# Patient Record
Sex: MALE | Race: BLACK OR AFRICAN AMERICAN | NOT HISPANIC OR LATINO | Employment: OTHER | URBAN - METROPOLITAN AREA
[De-identification: names, ages, dates, MRNs, and addresses within clinical notes are randomized per-mention and may not be internally consistent; named-entity substitution may affect disease eponyms.]

---

## 2021-08-16 ENCOUNTER — OFFICE VISIT (OUTPATIENT)
Dept: FAMILY MEDICINE | Facility: CLINIC | Age: 71
End: 2021-08-16

## 2021-08-16 VITALS
HEIGHT: 70 IN | SYSTOLIC BLOOD PRESSURE: 129 MMHG | WEIGHT: 208.8 LBS | TEMPERATURE: 98.3 F | OXYGEN SATURATION: 99 % | BODY MASS INDEX: 29.89 KG/M2 | DIASTOLIC BLOOD PRESSURE: 85 MMHG | HEART RATE: 76 BPM

## 2021-08-16 DIAGNOSIS — Z79.899 MEDICATION MANAGEMENT: ICD-10-CM

## 2021-08-16 DIAGNOSIS — Z13.0 SCREENING FOR DEFICIENCY ANEMIA: ICD-10-CM

## 2021-08-16 DIAGNOSIS — N39.9 URINARY PROBLEM IN MALE: Primary | ICD-10-CM

## 2021-08-16 DIAGNOSIS — N40.1 BENIGN PROSTATIC HYPERPLASIA WITH WEAK URINARY STREAM: ICD-10-CM

## 2021-08-16 DIAGNOSIS — I10 BENIGN HYPERTENSION: ICD-10-CM

## 2021-08-16 DIAGNOSIS — Z12.11 COLON CANCER SCREENING: ICD-10-CM

## 2021-08-16 DIAGNOSIS — F52.32 DELAYED EJACULATION: ICD-10-CM

## 2021-08-16 DIAGNOSIS — R39.12 BENIGN PROSTATIC HYPERPLASIA WITH WEAK URINARY STREAM: ICD-10-CM

## 2021-08-16 DIAGNOSIS — Z12.5 SCREENING FOR PROSTATE CANCER: ICD-10-CM

## 2021-08-16 DIAGNOSIS — Z13.29 SCREENING FOR THYROID DISORDER: ICD-10-CM

## 2021-08-16 DIAGNOSIS — R97.20 ELEVATED PROSTATE SPECIFIC ANTIGEN (PSA): ICD-10-CM

## 2021-08-16 DIAGNOSIS — Z13.220 SCREENING FOR LIPID DISORDERS: ICD-10-CM

## 2021-08-16 LAB
ALBUMIN UR-MCNC: NEGATIVE MG/DL
APPEARANCE UR: CLEAR
BILIRUB UR QL STRIP: NEGATIVE
COLOR UR AUTO: YELLOW
ERYTHROCYTE [DISTWIDTH] IN BLOOD BY AUTOMATED COUNT: 14.4 % (ref 10–15)
GLUCOSE UR STRIP-MCNC: NEGATIVE MG/DL
HCT VFR BLD AUTO: 42.4 % (ref 40–53)
HGB BLD-MCNC: 14.7 G/DL (ref 13.3–17.7)
HGB UR QL STRIP: NEGATIVE
KETONES UR STRIP-MCNC: NEGATIVE MG/DL
LEUKOCYTE ESTERASE UR QL STRIP: NEGATIVE
MCH RBC QN AUTO: 30.4 PG (ref 26.5–33)
MCHC RBC AUTO-ENTMCNC: 34.7 G/DL (ref 31.5–36.5)
MCV RBC AUTO: 88 FL (ref 78–100)
NITRATE UR QL: NEGATIVE
PH UR STRIP: 5.5 [PH] (ref 5–7)
PLATELET # BLD AUTO: 224 10E3/UL (ref 150–450)
RBC # BLD AUTO: 4.83 10E6/UL (ref 4.4–5.9)
RBC #/AREA URNS AUTO: NORMAL /HPF
SP GR UR STRIP: 1.02 (ref 1–1.03)
UROBILINOGEN UR STRIP-ACNC: 0.2 E.U./DL
WBC # BLD AUTO: 7.1 10E3/UL (ref 4–11)
WBC #/AREA URNS AUTO: NORMAL /HPF

## 2021-08-16 PROCEDURE — 85027 COMPLETE CBC AUTOMATED: CPT | Performed by: INTERNAL MEDICINE

## 2021-08-16 PROCEDURE — 99204 OFFICE O/P NEW MOD 45 MIN: CPT | Performed by: INTERNAL MEDICINE

## 2021-08-16 PROCEDURE — 84443 ASSAY THYROID STIM HORMONE: CPT | Performed by: INTERNAL MEDICINE

## 2021-08-16 PROCEDURE — 81001 URINALYSIS AUTO W/SCOPE: CPT | Performed by: INTERNAL MEDICINE

## 2021-08-16 PROCEDURE — G0103 PSA SCREENING: HCPCS | Performed by: INTERNAL MEDICINE

## 2021-08-16 PROCEDURE — 80053 COMPREHEN METABOLIC PANEL: CPT | Performed by: INTERNAL MEDICINE

## 2021-08-16 PROCEDURE — 80061 LIPID PANEL: CPT | Performed by: INTERNAL MEDICINE

## 2021-08-16 PROCEDURE — 36415 COLL VENOUS BLD VENIPUNCTURE: CPT | Performed by: INTERNAL MEDICINE

## 2021-08-16 RX ORDER — TAMSULOSIN HYDROCHLORIDE 0.4 MG/1
0.4 CAPSULE ORAL DAILY
Qty: 30 CAPSULE | Refills: 1 | Status: SHIPPED | OUTPATIENT
Start: 2021-08-16 | End: 2021-09-10

## 2021-08-16 RX ORDER — LOSARTAN POTASSIUM AND HYDROCHLOROTHIAZIDE 12.5; 5 MG/1; MG/1
TABLET ORAL
COMMUNITY
Start: 2021-08-02 | End: 2022-12-14

## 2021-08-16 ASSESSMENT — MIFFLIN-ST. JEOR: SCORE: 1713.36

## 2021-08-16 NOTE — PROGRESS NOTES
"    Assessment & Plan     Camilo was seen today for abdominal pain.    Diagnoses and all orders for this visit:    Urinary problem in male  Comments:  weak stream  Orders:  -     UA with Microscopic reflex to Culture - lab collect; Future  -     UA with Microscopic reflex to Culture - lab collect  -     Urine Microscopic  Patient reports problem with urinary stream  It this week  Denied any pain on the bottom  Denied any urinary frequency or urgency  He has weak stream  He has delayed ejaculation sometimes   I think patient has enlarged prostate  I started him on Flomax  Will see if that improves his  urinary problem  We will do some basic lab work  Down the road he can be referred to urology  Patient does not have any  primary care provider  He would like to get established with male Doctor  Follow-up is  scheduled with Dr. Saavedra       Delayed ejaculation  I have ordered basic lab work  Will refer to urology if needed      Benign hypertension  -     Comprehensive metabolic panel (BMP + Alb, Alk Phos, ALT, AST, Total. Bili, TP); Future  -     Comprehensive metabolic panel (BMP + Alb, Alk Phos, ALT, AST, Total. Bili, TP)  Well-controlled    Screening for thyroid disorder  TSH      Benign prostatic hyperplasia with weak urinary stream  -     tamsulosin (FLOMAX) 0.4 MG capsule; Take 1 capsule (0.4 mg) by mouth daily    Screening for deficiency anemia  -     CBC with platelets; Future  -     CBC with platelets        Screening for prostate cancer  -     PSA, screen; Future  -     PSA, screen    Screening for lipid disorders  -     Lipid panel reflex to direct LDL Non-fasting; Future  -     Lipid panel reflex to direct LDL Non-fasting    Colon cancer screening  -     Fecal colorectal cancer screen (FIT); Future           BMI:   Estimated body mass index is 29.96 kg/m  as calculated from the following:    Height as of this encounter: 1.778 m (5' 10\").    Weight as of this encounter: 94.7 kg (208 lb 12.8 oz). " "      See Patient Instructions  Patient Instructions   Labs today  Start taking flomax 0.4 mg daily  Follow up in 2-3 weeks  Seek sooner medical attention if there is any worsening of symptoms or problems.        Return in about 2 weeks (around 8/30/2021) for Hypertension BPH.    Nickie Dial MD  [facility] CEE Page is a 71 year old who presents for the following health issues     HPI     Complaining of decreased stream of urine   no pain, no burning  No fever, no blood  Difficulty with ejaculation    Review of Systems   Constitutional, HEENT, cardiovascular, pulmonary, GI, , musculoskeletal, neuro, skin, endocrine and psych systems are negative, except as otherwise noted.      Objective    /85 (BP Location: Right arm, Cuff Size: Adult Large)   Pulse 76   Temp 98.3  F (36.8  C) (Tympanic)   Ht 1.778 m (5' 10\")   Wt 94.7 kg (208 lb 12.8 oz)   SpO2 99%   BMI 29.96 kg/m    Body mass index is 29.96 kg/m .  Physical Exam     She is very nice and pleasant.  She is comfortable and not in any kind of distress.  She is fully alert awake oriented.  Abdomen soft no guarding ,no rigidity, bowel sounds are positive  Prostate is slightly enlarged  No abnormal masses were felt  I did not do testicular and penile exam as patient did not feel comfortable to be done by me.    30 minutes spent on the date of the encounter doing chart review, history and exam, documentation and further activities as noted abov        Number so could be related headacheDisclaimer: This note consists of symbols derived from keyboarding, dictation and/or voice recognition software. As a result, there may be errors in the script that have gone undetected. Please consider this when interpreting information found in this chart.    "

## 2021-08-16 NOTE — LETTER
August 18, 2021      Camilo Vela  [address]  [facility] [address]        Dear [place],    We are writing to inform you of your test results.    Tonny Page,     This is to inform you regarding your test result.     I tried to contact you yesterday and today but got the VM.   Your PSA (prostate cancer screening test)  is slightly elevated   This is not uncommon in your age group   Due to your symptoms   I recommend that you see urologist    Please call the following number to make the appointment :   [facility]: [facility] [phone]     TSH which is thyroid hormone is normal.   The testing of your kidney function, liver function and electrolytes was satisfactory .   Glucose which is your blood sugar is elevated.   Your total cholesterol is elevated.   The triglycerides are high. Lowering  the amount of sugar ,alcohol and sweets in the diet helps to control this.Exercise and weight loss helps.   Your LDL which is called bad cholesterol is elevated.   Eat low cholesterol low fat  diet and do regular physical activity.   Avoid high sugar containing food   Follow up with your PCP         Resulted Orders   PSA, screen   Result Value Ref Range    Prostate Specific Antigen Screen 4.74 (H) 0.00 - 4.00 ug/L   Comprehensive metabolic panel (BMP + Alb, Alk Phos, ALT, AST, Total. Bili, TP)   Result Value Ref Range    Sodium 138 133 - 144 mmol/L    Potassium 4.3 3.4 - 5.3 mmol/L    Chloride 106 94 - 109 mmol/L    Carbon Dioxide (CO2) 30 20 - 32 mmol/L    Anion Gap 2 (L) 3 - 14 mmol/L    Urea Nitrogen 12 7 - 30 mg/dL    Creatinine 0.79 0.66 - 1.25 mg/dL    Calcium 9.6 8.5 - 10.1 mg/dL    Glucose 164 (H) 70 - 99 mg/dL    Alkaline Phosphatase 82 40 - 150 U/L    AST 21 0 - 45 U/L    ALT 30 0 - 70 U/L    Protein Total 7.5 6.8 - 8.8 g/dL    Albumin 3.7 3.4 - 5.0 g/dL    Bilirubin Total 0.3 0.2 - 1.3 mg/dL    GFR Estimate >90 >60 mL/min/1.73m2      Comment:      As of July 11, 2021, eGFR is calculated by  the CKD-EPI creatinine equation, without race adjustment. eGFR can be influenced by muscle mass, exercise, and diet. The reported eGFR is an estimation only and is only applicable if the renal function is stable.   UA with Microscopic reflex to Culture - lab collect   Result Value Ref Range    Color Urine Yellow Colorless, Straw, Light Yellow, Yellow    Appearance Urine Clear Clear    Glucose Urine Negative Negative mg/dL    Bilirubin Urine Negative Negative    Ketones Urine Negative Negative mg/dL    Specific Gravity Urine 1.020 1.003 - 1.035    Blood Urine Negative Negative    pH Urine 5.5 5.0 - 7.0    Protein Albumin Urine Negative Negative mg/dL    Urobilinogen Urine 0.2 0.2, 1.0 E.U./dL    Nitrite Urine Negative Negative    Leukocyte Esterase Urine Negative Negative   CBC with platelets   Result Value Ref Range    WBC Count 7.1 4.0 - 11.0 10e3/uL    RBC Count 4.83 4.40 - 5.90 10e6/uL    Hemoglobin 14.7 13.3 - 17.7 g/dL    Hematocrit 42.4 40.0 - 53.0 %    MCV 88 78 - 100 fL    MCH 30.4 26.5 - 33.0 pg    MCHC 34.7 31.5 - 36.5 g/dL    RDW 14.4 10.0 - 15.0 %    Platelet Count 224 150 - 450 10e3/uL   Lipid panel reflex to direct LDL Non-fasting   Result Value Ref Range    Cholesterol 234 (H) <200 mg/dL      Comment:      Age 0-19 years  Desirable: <170 mg/dL  Borderline high:  170-199 mg/dl  High:            >199 mg/dl    Age 20 years and older  Desirable: <200 mg/dL    Triglycerides 195 (H) <150 mg/dL      Comment:      0-9 years:  Normal:    Less than 75 mg/dL  Borderline high:  75-99 mg/dL  High:             Greater than or equal to 100 mg/dL    0-19 years:  Normal:    Less than 90 mg/dL  Borderline high:   mg/dL  High:             Greater than or equal to 130 mg/dL    20 years and older:  Normal:    Less than 150 mg/dL  Borderline high:  150-199 mg/dL  High:             200-499 mg/dL  Very high:   Greater than or equal to 500 mg/dL    Direct Measure HDL 52 >=40 mg/dL      Comment:      0-19 years:        Greater than or equal to 45 mg/dL   Low: Less than 40 mg/dL   Borderline low: 40-44 mg/dL     20 years and older:   Female: Greater than or equal to 50 mg/dL   Male:   Greater than or equal to 40 mg/dL         LDL Cholesterol Calculated 143 (H) <=100 mg/dL      Comment:      Age 0-19 years:  Desirable: 0-110 mg/dL   Borderline high: 110-129 mg/dL   High: >= 130 mg/dL    Age 20 years and older:  Desirable: <100mg/dL  Above desirable: 100-129 mg/dL   Borderline high: 130-159 mg/dL   High: 160-189 mg/dL   Very high: >= 190 mg/dL    Non HDL Cholesterol 182 (H) <130 mg/dL      Comment:      0-19 years:  Desirable:          Less than 120 mg/dL  Borderline high:   120-144 mg/dL  High:                   Greater than or equal to 145 mg/dL    20 years and older:  Desirable:          130 mg/dL  Above Desirable: 130-159 mg/dL  Borderline high:   160-189 mg/dL  High:               190-219 mg/dL  Very high:     Greater than or equal to 220 mg/dL    Patient Fasting > 8hrs? No    Urine Microscopic   Result Value Ref Range    RBC Urine 0-2 0-2 /HPF /HPF    WBC Urine 0-5 0-5 /HPF /HPF    Narrative    Urine Culture not indicated   TSH with free T4 reflex   Result Value Ref Range    TSH 1.28 0.40 - 4.00 mU/L       If you have any questions or concerns, please call the clinic at the number listed above.         Sincerely,       Dr.Nasima Yojana MD,FACP

## 2021-08-16 NOTE — PATIENT INSTRUCTIONS
Labs today  Start taking flomax 0.4 mg daily  Follow up in 2-3 weeks  Seek sooner medical attention if there is any worsening of symptoms or problems.

## 2021-08-16 NOTE — PROGRESS NOTES
"    {PROVIDER CHARTING PREFERENCE:142490}    Subjective   Camilo is a 71 year old who presents for the following health issues {ACCOMPANIED BY STATEMENT (Optional):533450}    HPI     {SUPERLIST (Optional):839963}  {additonal problems for provider to add (Optional):560952}    Review of Systems   {ROS COMP (Optional):644049}      Objective    /85 (BP Location: Right arm, Cuff Size: Adult Large)   Pulse 76   Temp 98.3  F (36.8  C) (Tympanic)   Ht 1.778 m (5' 10\")   Wt 94.7 kg (208 lb 12.8 oz)   SpO2 99%   BMI 29.96 kg/m    Body mass index is 29.96 kg/m .  Physical Exam   {Exam List (Optional):379507}    {Diagnostic Test Results (Optional):039969}    {AMBULATORY ATTESTATION (Optional):317103}        "

## 2021-08-17 LAB
ALBUMIN SERPL-MCNC: 3.7 G/DL (ref 3.4–5)
ALP SERPL-CCNC: 82 U/L (ref 40–150)
ALT SERPL W P-5'-P-CCNC: 30 U/L (ref 0–70)
ANION GAP SERPL CALCULATED.3IONS-SCNC: 2 MMOL/L (ref 3–14)
AST SERPL W P-5'-P-CCNC: 21 U/L (ref 0–45)
BILIRUB SERPL-MCNC: 0.3 MG/DL (ref 0.2–1.3)
BUN SERPL-MCNC: 12 MG/DL (ref 7–30)
CALCIUM SERPL-MCNC: 9.6 MG/DL (ref 8.5–10.1)
CHLORIDE BLD-SCNC: 106 MMOL/L (ref 94–109)
CHOLEST SERPL-MCNC: 234 MG/DL
CO2 SERPL-SCNC: 30 MMOL/L (ref 20–32)
CREAT SERPL-MCNC: 0.79 MG/DL (ref 0.66–1.25)
FASTING STATUS PATIENT QL REPORTED: NO
GFR SERPL CREATININE-BSD FRML MDRD: >90 ML/MIN/1.73M2
GLUCOSE BLD-MCNC: 164 MG/DL (ref 70–99)
HDLC SERPL-MCNC: 52 MG/DL
LDLC SERPL CALC-MCNC: 143 MG/DL
NONHDLC SERPL-MCNC: 182 MG/DL
POTASSIUM BLD-SCNC: 4.3 MMOL/L (ref 3.4–5.3)
PROT SERPL-MCNC: 7.5 G/DL (ref 6.8–8.8)
PSA SERPL-MCNC: 4.74 UG/L (ref 0–4)
SODIUM SERPL-SCNC: 138 MMOL/L (ref 133–144)
TRIGL SERPL-MCNC: 195 MG/DL
TSH SERPL DL<=0.005 MIU/L-ACNC: 1.28 MU/L (ref 0.4–4)

## 2021-08-17 NOTE — RESULT ENCOUNTER NOTE
Tonny Page,    This is to inform you regarding your test result.    CBC result which includes white count Hemoglobin and  Platelet Counts is normal.   Urine test is negative for infection.  Other test results are pending.        Sincerely,      Dr.Nasima Yojana MD,FACP

## 2021-08-18 PROBLEM — R97.20 ELEVATED PROSTATE SPECIFIC ANTIGEN (PSA): Status: ACTIVE | Noted: 2021-08-18

## 2021-08-18 PROBLEM — F52.32 DELAYED EJACULATION: Status: ACTIVE | Noted: 2021-08-18

## 2021-08-18 NOTE — RESULT ENCOUNTER NOTE
Tonny Page,    This is to inform you regarding your test result.    I tried to contact you yesterday and today but got the VM.  Your PSA (prostate cancer screening test)  is slightly elevated   This is not uncommon in your age group  Due to your symptoms   I recommend that you see urologist   Please call the following number to make the appointment :  [facility]: [facility] [place] [phone]    TSH which is thyroid hormone is normal.  The testing of your kidney function, liver function and electrolytes was satisfactory .  Glucose which is your blood sugar is elevated.  Your total cholesterol is elevated.  The triglycerides are high. Lowering  the amount of sugar ,alcohol and sweets in the diet helps to control this.Exercise and weight loss helps.  Your LDL which is called bad cholesterol is elevated.  Eat low cholesterol low fat  diet and do regular physical activity.  Avoid high sugar containing food   Follow up with your PCP          Sincerely,      Dr.Nasima Yojana MD,FACP

## 2021-09-08 DIAGNOSIS — N40.1 BENIGN PROSTATIC HYPERPLASIA WITH WEAK URINARY STREAM: ICD-10-CM

## 2021-09-08 DIAGNOSIS — R39.12 BENIGN PROSTATIC HYPERPLASIA WITH WEAK URINARY STREAM: ICD-10-CM

## 2021-09-10 RX ORDER — TAMSULOSIN HYDROCHLORIDE 0.4 MG/1
CAPSULE ORAL
Qty: 90 CAPSULE | Refills: 2 | Status: SHIPPED | OUTPATIENT
Start: 2021-09-10 | End: 2021-12-29

## 2021-10-11 ENCOUNTER — HEALTH MAINTENANCE LETTER (OUTPATIENT)
Age: 71
End: 2021-10-11

## 2022-09-25 ENCOUNTER — HEALTH MAINTENANCE LETTER (OUTPATIENT)
Age: 72
End: 2022-09-25

## 2022-12-14 ENCOUNTER — OFFICE VISIT (OUTPATIENT)
Dept: FAMILY MEDICINE | Facility: CLINIC | Age: 72
End: 2022-12-14

## 2022-12-14 VITALS
HEART RATE: 66 BPM | OXYGEN SATURATION: 99 % | BODY MASS INDEX: 30.81 KG/M2 | RESPIRATION RATE: 18 BRPM | TEMPERATURE: 96.6 F | WEIGHT: 208 LBS | SYSTOLIC BLOOD PRESSURE: 138 MMHG | HEIGHT: 69 IN | DIASTOLIC BLOOD PRESSURE: 88 MMHG

## 2022-12-14 DIAGNOSIS — Z00.00 ROUTINE GENERAL MEDICAL EXAMINATION AT A HEALTH CARE FACILITY: Primary | ICD-10-CM

## 2022-12-14 DIAGNOSIS — N40.1 BENIGN PROSTATIC HYPERPLASIA WITH WEAK URINARY STREAM: ICD-10-CM

## 2022-12-14 DIAGNOSIS — I10 ESSENTIAL HYPERTENSION, BENIGN: ICD-10-CM

## 2022-12-14 DIAGNOSIS — R73.01 IFG (IMPAIRED FASTING GLUCOSE): ICD-10-CM

## 2022-12-14 DIAGNOSIS — R39.12 BENIGN PROSTATIC HYPERPLASIA WITH WEAK URINARY STREAM: ICD-10-CM

## 2022-12-14 LAB
ALBUMIN SERPL BCG-MCNC: 4.4 G/DL (ref 3.5–5.2)
ALP SERPL-CCNC: 91 U/L (ref 40–129)
ALT SERPL W P-5'-P-CCNC: 26 U/L (ref 10–50)
ANION GAP SERPL CALCULATED.3IONS-SCNC: 10 MMOL/L (ref 7–15)
AST SERPL W P-5'-P-CCNC: 28 U/L (ref 10–50)
BILIRUB SERPL-MCNC: 0.5 MG/DL
BUN SERPL-MCNC: 15.6 MG/DL (ref 8–23)
CALCIUM SERPL-MCNC: 9.8 MG/DL (ref 8.8–10.2)
CHLORIDE SERPL-SCNC: 102 MMOL/L (ref 98–107)
CHOLEST SERPL-MCNC: 208 MG/DL
CREAT SERPL-MCNC: 0.93 MG/DL (ref 0.67–1.17)
DEPRECATED HCO3 PLAS-SCNC: 28 MMOL/L (ref 22–29)
ERYTHROCYTE [DISTWIDTH] IN BLOOD BY AUTOMATED COUNT: 13.3 % (ref 10–15)
GFR SERPL CREATININE-BSD FRML MDRD: 88 ML/MIN/1.73M2
GLUCOSE SERPL-MCNC: 124 MG/DL (ref 70–99)
HBA1C MFR BLD: 7.2 % (ref 0–5.6)
HCT VFR BLD AUTO: 44.8 % (ref 40–53)
HDLC SERPL-MCNC: 52 MG/DL
HGB BLD-MCNC: 14.8 G/DL (ref 13.3–17.7)
LDLC SERPL CALC-MCNC: 135 MG/DL
MCH RBC QN AUTO: 29 PG (ref 26.5–33)
MCHC RBC AUTO-ENTMCNC: 33 G/DL (ref 31.5–36.5)
MCV RBC AUTO: 88 FL (ref 78–100)
NONHDLC SERPL-MCNC: 156 MG/DL
PLATELET # BLD AUTO: 210 10E3/UL (ref 150–450)
POTASSIUM SERPL-SCNC: 4.6 MMOL/L (ref 3.4–5.3)
PROT SERPL-MCNC: 7.7 G/DL (ref 6.4–8.3)
PSA SERPL-MCNC: 5.92 NG/ML (ref 0–6.5)
RBC # BLD AUTO: 5.11 10E6/UL (ref 4.4–5.9)
SODIUM SERPL-SCNC: 140 MMOL/L (ref 136–145)
TRIGL SERPL-MCNC: 105 MG/DL
WBC # BLD AUTO: 6.8 10E3/UL (ref 4–11)

## 2022-12-14 PROCEDURE — 99397 PER PM REEVAL EST PAT 65+ YR: CPT | Performed by: INTERNAL MEDICINE

## 2022-12-14 PROCEDURE — 83036 HEMOGLOBIN GLYCOSYLATED A1C: CPT | Performed by: INTERNAL MEDICINE

## 2022-12-14 PROCEDURE — 36415 COLL VENOUS BLD VENIPUNCTURE: CPT | Performed by: INTERNAL MEDICINE

## 2022-12-14 PROCEDURE — G0103 PSA SCREENING: HCPCS | Performed by: INTERNAL MEDICINE

## 2022-12-14 PROCEDURE — 80061 LIPID PANEL: CPT | Performed by: INTERNAL MEDICINE

## 2022-12-14 PROCEDURE — 80053 COMPREHEN METABOLIC PANEL: CPT | Performed by: INTERNAL MEDICINE

## 2022-12-14 PROCEDURE — 85027 COMPLETE CBC AUTOMATED: CPT | Performed by: INTERNAL MEDICINE

## 2022-12-14 RX ORDER — LOSARTAN POTASSIUM AND HYDROCHLOROTHIAZIDE 12.5; 5 MG/1; MG/1
1 TABLET ORAL DAILY
Qty: 90 TABLET | Refills: 3 | Status: SHIPPED | OUTPATIENT
Start: 2022-12-14 | End: 2022-12-14

## 2022-12-14 RX ORDER — PANTOPRAZOLE SODIUM 40 MG/1
TABLET, DELAYED RELEASE ORAL
COMMUNITY
Start: 2022-11-04 | End: 2023-04-27

## 2022-12-14 RX ORDER — TAMSULOSIN HYDROCHLORIDE 0.4 MG/1
0.4 CAPSULE ORAL DAILY
Qty: 90 CAPSULE | Refills: 3 | Status: SHIPPED | OUTPATIENT
Start: 2022-12-14 | End: 2022-12-14

## 2022-12-14 RX ORDER — LOSARTAN POTASSIUM AND HYDROCHLOROTHIAZIDE 12.5; 5 MG/1; MG/1
1 TABLET ORAL DAILY
Qty: 90 TABLET | Refills: 3 | Status: SHIPPED | OUTPATIENT
Start: 2022-12-14 | End: 2023-10-09

## 2022-12-14 RX ORDER — TAMSULOSIN HYDROCHLORIDE 0.4 MG/1
0.4 CAPSULE ORAL DAILY
Qty: 90 CAPSULE | Refills: 3 | Status: SHIPPED | OUTPATIENT
Start: 2022-12-14 | End: 2023-04-27

## 2022-12-14 ASSESSMENT — PAIN SCALES - GENERAL: PAINLEVEL: NO PAIN (0)

## 2022-12-14 NOTE — PROGRESS NOTES
"Leticia Page is a 72 year old accompanied by his son, presenting for the following health issues:  No chief complaint on file.      History of Present Illness       Reason for visit:  Physical    He eats 2-3 servings of fruits and vegetables daily.He consumes 2 sweetened beverage(s) daily.He exercises with enough effort to increase his heart rate 10 to 19 minutes per day.  He exercises with enough effort to increase his heart rate 3 or less days per week.   He is taking medications regularly.        Benign prostatic hyperplasia with weak urinary stream  Essential hypertension, al Vela has been doing well with lisinopril/hctz.  No side effects.  No light-headedness.  No dizziness.  He is not drinking enough water.  He is drinking tea, smoothies.  He has no dry cough.        Review of Systems   Constitutional, HEENT, cardiovascular, pulmonary, GI,  + urinary frequency, musculoskeletal, neuro, skin, endocrine and psych systems are negative, except as otherwise noted.      Objective    /88 (BP Location: Right arm, Patient Position: Chair, Cuff Size: Adult Large)   Pulse 66   Temp (!) 96.6  F (35.9  C) (Temporal)   Resp 18   Ht 1.753 m (5' 9\")   Wt 94.3 kg (208 lb)   SpO2 99%   BMI 30.72 kg/m    Body mass index is 30.72 kg/m .  Physical Exam   GENERAL: healthy, alert and no distress  EYES: Eyes grossly normal to inspection, PERRL and conjunctivae and sclerae normal  HENT: ear canals and TM's normal, nose and mouth without ulcers or lesions  NECK: no adenopathy, no asymmetry, masses, or scars and thyroid normal to palpation  RESP: lungs clear to auscultation - no rales, rhonchi or wheezes  CV: regular rate and rhythm, normal S1 S2, no S3 or S4, no murmur, click or rub   ABDOMEN: soft, nontender, no hepatosplenomegaly   MS: no gross musculoskeletal defects noted, no edema  SKIN: no suspicious lesions or rashes  NEURO: Normal strength and tone, mentation intact and speech " normal  PSYCH: mentation appears normal, affect normal/bright      Patient Instructions   (Z00.00) Routine general medical examination at a health care facility  (primary encounter diagnosis)  Comment: For routine exam, we will draw labs as ordered, cholesterol, diabetes mellitus check, liver function, renal function, PSA and refer for colonoscopy.  We will also update vaccination history. TDAP, PCV 20 (pneumonia) recommended. Shingrix vaccine is now available.  I would call your insurance to see if a shingles vaccine is covered and get this at your pharmacy   Plan: Prostate Specific Antigen Screen, Lipid panel         reflex to direct LDL Fasting, Comprehensive         metabolic panel, CBC with platelets,         Colonoscopy Screening  Referral            (N40.1,  R39.12) Benign prostatic hyperplasia with weak urinary stream  Comment: OK to continue Flomax  Plan: tamsulosin (FLOMAX) 0.4 MG capsule            (I10) Essential hypertension, benign  Comment: Continue losartan/hydrochlorothiazide   Plan: losartan-hydrochlorothiazide (HYZAAR) 50-12.5         MG tablet              [facility]  [address], [place] [address] [phone]

## 2022-12-14 NOTE — PATIENT INSTRUCTIONS
(Z00.00) Routine general medical examination at a health care facility  (primary encounter diagnosis)  Comment: For routine exam, we will draw labs as ordered, cholesterol, diabetes mellitus check, liver function, renal function, PSA and refer for colonoscopy.  We will also update vaccination history. TDAP, PCV 20 (pneumonia) recommended. Shingrix vaccine is now available.  I would call your insurance to see if a shingles vaccine is covered and get this at your pharmacy   Plan: Prostate Specific Antigen Screen, Lipid panel         reflex to direct LDL Fasting, Comprehensive         metabolic panel, CBC with platelets,         Colonoscopy Screening  Referral            (N40.1,  R39.12) Benign prostatic hyperplasia with weak urinary stream  Comment: OK to continue Flomax  Plan: tamsulosin (FLOMAX) 0.4 MG capsule            (I10) Essential hypertension, benign  Comment: Continue losartan/hydrochlorothiazide   Plan: losartan-hydrochlorothiazide (HYZAAR) 50-12.5         MG tablet              [facility]  [address], [place] [address] [phone]

## 2022-12-16 ENCOUNTER — TELEPHONE (OUTPATIENT)
Dept: FAMILY MEDICINE | Facility: CLINIC | Age: 72
End: 2022-12-16

## 2022-12-16 DIAGNOSIS — E11.9 TYPE 2 DIABETES MELLITUS WITHOUT COMPLICATION, WITHOUT LONG-TERM CURRENT USE OF INSULIN (H): ICD-10-CM

## 2022-12-16 DIAGNOSIS — E78.5 HYPERLIPIDEMIA LDL GOAL <100: Primary | ICD-10-CM

## 2022-12-16 NOTE — LETTER
December 22, 2022      Camilo Vela  [address]  [place] [facility] [address]              Dear Camilo,       I had the opportunity to review your recent labs and a summary of your labs reads as follows:     Your complete blood counts show no sign of anemia, normal white blood cell count and platelets.  Your comprehensive metabolic panel showed normal renal function, normal liver function,   Your hemoglobin A1c shows worsening average and I would recommend that you schedule a follow up to discuss diagnosis of diabetes mellitus   Your fasting lipid panel show  - normal HDL (good) cholesterol -as your goal is greater than 40  - low LDL (bad) cholesterol as your goal is less than 160 - however there has recently been a change in the guidelines for treatment of cholesterol and we now are recommending that a the decision to take statin medications be based more so on the calculated cardiac risk as estimated by The American College of Cardiology Risk .  Given your estimated risk of coronary artery disease > 7.5% I would recommend starting a new medication of atorvastatin 20 mg.   - normal triglyceride levels  Your PSA level is also stable indicating no evidence of prostate cancer            Sincerely,  Steve Hernandez MD               Sincerely,      Steve Hernandez MD

## 2022-12-17 NOTE — RESULT ENCOUNTER NOTE
Yury Page,    I had the opportunity to review your recent labs and a summary of your labs reads as follows:    Your complete blood counts show no sign of anemia, normal white blood cell count and platelets.  Your comprehensive metabolic panel showed normal renal function, normal liver function,   Your hemoglobin A1c shows worsening average and I would recommend that you schedule a follow up to discuss diagnosis of diabetes mellitus   Your fasting lipid panel show  - normal HDL (good) cholesterol -as your goal is greater than 40  - low LDL (bad) cholesterol as your goal is less than 160 - however there has recently been a change in the guidelines for treatment of cholesterol and we now are recommending that a the decision to take statin medications be based more so on the calculated cardiac risk as estimated by The American College of Cardiology Risk .  Given your estimated risk of coronary artery disease > 7.5% I would recommend starting a new medication of atorvastatin 20 mg.   - normal triglyceride levels  Your PSA level is also stable indicating no evidence of prostate cancer         Sincerely,  Steve Hernandez MD

## 2022-12-17 NOTE — TELEPHONE ENCOUNTER
Can we call Camilo Vela and let him know that       Yury Page,    I had the opportunity to review your recent labs and a summary of your labs reads as follows:    Your complete blood counts show no sign of anemia, normal white blood cell count and platelets.  Your comprehensive metabolic panel showed normal renal function, normal liver function,   Your hemoglobin A1c shows worsening average and I would recommend that you schedule a follow up to discuss diagnosis of diabetes mellitus   Your fasting lipid panel show  - normal HDL (good) cholesterol -as your goal is greater than 40  - low LDL (bad) cholesterol as your goal is less than 160 - however there has recently been a change in the guidelines for treatment of cholesterol and we now are recommending that a the decision to take statin medications be based more so on the calculated cardiac risk as estimated by The American College of Cardiology Risk .  Given your estimated risk of coronary artery disease > 7.5% I would recommend starting a new medication of atorvastatin 20 mg.   - normal triglyceride levels  Your PSA level is also stable indicating no evidence of prostate cancer           Sincerely,  Steve Hernandez MD

## 2022-12-20 NOTE — TELEPHONE ENCOUNTER
Patient Contact    Attempt # 1    Was call answered?  No.  Left message on voicemail with information to call me back.    On call back, please relay provider message.

## 2022-12-21 NOTE — TELEPHONE ENCOUNTER
Patient Contact    Attempt # 2    Was call answered?  No.  Left message on voicemail with information to call me back.      Upon chart review, pt has read results and note from PCP. Sent [employer] message with information to call clinic back if pt has any questions or concerns.     Ness Mattson RN

## 2022-12-22 NOTE — TELEPHONE ENCOUNTER
To PCP:  ELPIDIO, mailed letter to patient due to patient has not checked mychart message and has not answered phone calls.      Patient Contact    Attempt # 3    Was call answered? No.    Left message for patient to call triage back.    Priscila Casarez RN

## 2023-02-03 ENCOUNTER — OFFICE VISIT (OUTPATIENT)
Dept: FAMILY MEDICINE | Facility: CLINIC | Age: 73
End: 2023-02-03
Payer: MEDICARE

## 2023-02-03 VITALS
RESPIRATION RATE: 18 BRPM | DIASTOLIC BLOOD PRESSURE: 75 MMHG | WEIGHT: 199.1 LBS | HEIGHT: 70 IN | BODY MASS INDEX: 28.5 KG/M2 | SYSTOLIC BLOOD PRESSURE: 132 MMHG | TEMPERATURE: 97.3 F | HEART RATE: 66 BPM | OXYGEN SATURATION: 99 %

## 2023-02-03 DIAGNOSIS — Z12.11 SCREEN FOR COLON CANCER: ICD-10-CM

## 2023-02-03 DIAGNOSIS — E78.5 HYPERLIPIDEMIA LDL GOAL <100: ICD-10-CM

## 2023-02-03 DIAGNOSIS — N13.9 LOWER URINARY TRACT OBSTRUCTIVE SYNDROME: ICD-10-CM

## 2023-02-03 DIAGNOSIS — I10 BENIGN HYPERTENSION: Primary | ICD-10-CM

## 2023-02-03 DIAGNOSIS — Z87.898 HISTORY OF VERTIGO: ICD-10-CM

## 2023-02-03 DIAGNOSIS — E11.9 TYPE 2 DIABETES MELLITUS WITHOUT COMPLICATION, WITHOUT LONG-TERM CURRENT USE OF INSULIN (H): ICD-10-CM

## 2023-02-03 DIAGNOSIS — R97.20 ELEVATED PROSTATE SPECIFIC ANTIGEN (PSA): ICD-10-CM

## 2023-02-03 LAB
CHOLEST SERPL-MCNC: 208 MG/DL
CREAT UR-MCNC: 237 MG/DL
HBA1C MFR BLD: 6.8 % (ref 0–5.6)
HDLC SERPL-MCNC: 52 MG/DL
LDLC SERPL CALC-MCNC: 143 MG/DL
MICROALBUMIN UR-MCNC: <12 MG/L
MICROALBUMIN/CREAT UR: NORMAL MG/G{CREAT}
NONHDLC SERPL-MCNC: 156 MG/DL
TRIGL SERPL-MCNC: 66 MG/DL

## 2023-02-03 PROCEDURE — 99215 OFFICE O/P EST HI 40 MIN: CPT | Performed by: INTERNAL MEDICINE

## 2023-02-03 PROCEDURE — 80061 LIPID PANEL: CPT | Performed by: INTERNAL MEDICINE

## 2023-02-03 PROCEDURE — 82570 ASSAY OF URINE CREATININE: CPT | Performed by: INTERNAL MEDICINE

## 2023-02-03 PROCEDURE — 83036 HEMOGLOBIN GLYCOSYLATED A1C: CPT | Performed by: INTERNAL MEDICINE

## 2023-02-03 PROCEDURE — 36415 COLL VENOUS BLD VENIPUNCTURE: CPT | Performed by: INTERNAL MEDICINE

## 2023-02-03 PROCEDURE — 82043 UR ALBUMIN QUANTITATIVE: CPT | Performed by: INTERNAL MEDICINE

## 2023-02-03 ASSESSMENT — PAIN SCALES - GENERAL: PAINLEVEL: NO PAIN (0)

## 2023-02-03 NOTE — PROGRESS NOTES
"  Assessment & Plan   Problem List Items Addressed This Visit        Endocrine    Type 2 diabetes mellitus without complication (H)    Relevant Orders    Albumin Random Urine Quantitative with Creat Ratio (Completed)    Adult Eye  Referral    Hemoglobin A1c (Completed)    AMB Adult Diabetes Educator Referral    Hyperlipidemia LDL goal <100    Relevant Orders    Lipid panel reflex to direct LDL Fasting (Completed)       Circulatory    Benign hypertension - Primary    Relevant Orders    Albumin Random Urine Quantitative with Creat Ratio (Completed)       Other    Elevated prostate specific antigen (PSA)    Relevant Orders    Adult Urology  Referral   Other Visit Diagnoses     Screen for colon cancer        Lower urinary tract obstructive syndrome        History of vertigo             His vertigo symptoms have resolved since 5 months now, currently no neurologic deficit or cerebellar signs or symptoms on exam or by history.  Will continue to observe ; if any recurrent vertiginous or balance symptoms we will go ahead and do some imaging tests/studies and he will need to notify us immediately and come for exam and evaluation.  Will repeat labs per his request; lipid and A1c although it could be too early.  He made Some diet adjustment, likes to check where his Lab numbers are.  He was advised to start on cholesterol medication, which he did not, once again wants to wait for the lab results.  Blood pressure seems reasonably controlled on above medications.  Continue to monitor as outpatient and call us with BP readings.  Check a microalbumin.  Referral For diabetic eye exam: Referred to diabetic educator as well.  Further recommendation pending lab results.  For lower urinary symptoms advised to take flomax, and referral to urology; due to elevated PSA         BMI:   Estimated body mass index is 28.57 kg/m  as calculated from the following:    Height as of this encounter: 1.778 m (5' 10\").    Weight as " "of this encounter: 90.3 kg (199 lb 1.6 oz).   Weight management plan: Discussed healthy diet and exercise guidelines    Work on weight loss  Regular exercise  See Patient Instructions    Return if symptoms worsen or fail to improve, for As needed and if symptoms worsen.  Total time spent was 40 minutes, review of records, addressing multiple health conditions and exam.    Janey Saavedra MD  [facility] CEE Page is a 73 year old presenting for the following health issues:  Follow Up (Patient here to follow up on lab results.)      History of Present Illness       Reason for visit:  Physical Check    He eats 2-3 servings of fruits and vegetables daily.He consumes 0 sweetened beverage(s) daily.He exercises with enough effort to increase his heart rate 30 to 60 minutes per day.    He is taking medications regularly.       Patient presenting for follow-up for his blood pressure and also diabetes recent diagnosis A1c 7.2 he had made changes to his diet.  He reports his blood pressures in the 130s systolic at home.  Also is complaining of some vertiginous symptoms and imbalance occurred for couple days 5 couple months when [place] was overse[place] but symptoms seems to have abated now, he would like further checking on that.  Denies any chest pain and no dyspnea or palpitations  Describes some lower urinary obstructive symptoms ; voiding is slow.  --not using flomax; though was advised  Describes spinning sensation when lying supine, this [place] was [place], occurred for a week, no balance issues anymore, this happened 5 months ago      Review of Systems   Constitutional, HEENT, cardiovascular, pulmonary, gi and gu systems are negative, except as otherwise noted.      Objective    /75   Pulse 66   Temp 97.3  F (36.3  C) (Temporal)   Resp 18   Ht 1.778 m (5' 10\")   Wt 90.3 kg (199 lb 1.6 oz)   SpO2 99%   BMI 28.57 kg/m    Body mass index is 28.57 kg/m .  Physical Exam "   GENERAL: healthy, alert and no distress  EYES: Eyes grossly normal to inspection, PERRL and conjunctivae and sclerae normal  NECK: no adenopathy, no asymmetry, masses, or scars and thyroid normal to palpation, no carotid  RESP: lungs clear to auscultation - no rales, rhonchi or wheezes  CV: regular rate and rhythm, normal S1 S2, no S3 or S4, no murmur, click or rub, no peripheral edema and peripheral pulses strong  ABDOMEN: soft, nontender, no hepatosplenomegaly, no masses and bowel sounds normal  MS: no gross musculoskeletal defects noted, no edema  SKIN: no suspicious lesions or rashes  NEURO: Normal strength and tone, mentation intact and speech normal, normal finger-to-nose exam negative for cerebellar signs, normal tandem walking, negative Romberg, no dysmetria or dysdiadochokinesia. Normal head tilt test, no nystagmus,  PSYCH: mentation appears normal, affect normal/bright    Office Visit on 12/14/2022   Component Date Value Ref Range Status     Prostate Specific Antigen Screen 12/14/2022 5.92  0.00 - 6.50 ng/mL Final     Cholesterol 12/14/2022 208 (H)  <200 mg/dL Final     Triglycerides 12/14/2022 105  <150 mg/dL Final     Direct Measure HDL 12/14/2022 52  >=40 mg/dL Final     LDL Cholesterol Calculated 12/14/2022 135 (H)  <=100 mg/dL Final     Non HDL Cholesterol 12/14/2022 156 (H)  <130 mg/dL Final     Sodium 12/14/2022 140  136 - 145 mmol/L Final     Potassium 12/14/2022 4.6  3.4 - 5.3 mmol/L Final     Chloride 12/14/2022 102  98 - 107 mmol/L Final     Carbon Dioxide (CO2) 12/14/2022 28  22 - 29 mmol/L Final     Anion Gap 12/14/2022 10  7 - 15 mmol/L Final     Urea Nitrogen 12/14/2022 15.6  8.0 - 23.0 mg/dL Final     Creatinine 12/14/2022 0.93  0.67 - 1.17 mg/dL Final     Calcium 12/14/2022 9.8  8.8 - 10.2 mg/dL Final     Glucose 12/14/2022 124 (H)  70 - 99 mg/dL Final     Alkaline Phosphatase 12/14/2022 91  40 - 129 U/L Final     AST 12/14/2022 28  10 - 50 U/L Final     ALT 12/14/2022 26  10 - 50 U/L  Final     Protein Total 12/14/2022 7.7  6.4 - 8.3 g/dL Final     Albumin 12/14/2022 4.4  3.5 - 5.2 g/dL Final     Bilirubin Total 12/14/2022 0.5  <=1.2 mg/dL Final     GFR Estimate 12/14/2022 88  >60 mL/min/1.73m2 Final    Effective December 21, 2021 eGFRcr in adults is calculated using the 2021 CKD-EPI creatinine equation which includes age and gender (Eron et al., [place], DOI: 10.[facility]6/SGTSkv0046528)     WBC Count 12/14/2022 6.8  4.0 - 11.0 10e3/uL Final     RBC Count 12/14/2022 5.11  4.40 - 5.90 10e6/uL Final     Hemoglobin 12/14/2022 14.8  13.3 - 17.7 g/dL Final     Hematocrit 12/14/2022 44.8  40.0 - 53.0 % Final     MCV 12/14/2022 88  78 - 100 fL Final     MCH 12/14/2022 29.0  26.5 - 33.0 pg Final     MCHC 12/14/2022 33.0  31.5 - 36.5 g/dL Final     RDW 12/14/2022 13.3  10.0 - 15.0 % Final     Platelet Count 12/14/2022 210  150 - 450 10e3/uL Final     Hemoglobin A1C 12/14/2022 7.2 (H)  0.0 - 5.6 % Final    Normal <5.7%   Prediabetes 5.7-6.4%    Diabetes 6.5% or higher     Note: Adopted from ADA consensus guidelines.

## 2023-02-04 NOTE — RESULT ENCOUNTER NOTE
Yury Page, reviewed your labs,    Urine microalbumin is negative means there is no protein spilling in the urine which is reassuring,    Cholesterol shows stable numbers from 1 months ago ,LDL still elevated at 143, LDL the bad cholesterol ;goal is less than 100,  HDL the good cholesterol is at goal at 52 which is reassuring, HDL is atheroprotective to the heart, triglyceride is low  Please continue with lifestyle changes, healthy diet low sat fat diet, exercise activities and weight loss strongly recommended    Calculated your 10-year atherosclerotic cardiovascular disease risk score as below: is 42.5% which is very elevated , indicates that you need to be on cholesterol medication Called statins, I would recommend high-dose atorvastatin of 80 mg 1 tablet once daily at bedtime.    , Statins sometimes cause muscle aches ,if such occur please let us know, I will send prescription to pharmacy on record.    Test for diabetes HbA1c slightly improved to 6.8 from 7.2 but still in the diabetic range, I would recommend that you follow-up with the diabetic educator as discussed in the clinic, that will help manage diet calories and even medication for diabetes.  Referral has been placed and you will receive a call from them.    I do recommend that you start on medication called metformin that helps lower HbA1c further you can take 1 tablet 500 mg twice a day with meals always,  If you agree we can send the prescription to pharmacy on records.    Please let us know    Any further questions please let me know  Dr Saavedra    The 10-year ASCVD risk score (Purnima MCELROY, et al., 2019) is: 42.5%    Values used to calculate the score:      Age: 73 years      Sex: Male      Is Non- : No      Diabetic: Yes      Tobacco smoker: No      Systolic Blood Pressure: 132 mmHg      Is BP treated: Yes      HDL Cholesterol: 52 mg/dL      Total Cholesterol: 208 mg/dL

## 2023-02-06 ENCOUNTER — TELEPHONE (OUTPATIENT)
Dept: INTERNAL MEDICINE | Facility: CLINIC | Age: 73
End: 2023-02-06
Payer: MEDICARE

## 2023-02-06 DIAGNOSIS — E78.5 HYPERLIPIDEMIA LDL GOAL <70: Primary | ICD-10-CM

## 2023-02-06 RX ORDER — ATORVASTATIN CALCIUM 80 MG/1
80 TABLET, FILM COATED ORAL DAILY
Qty: 90 TABLET | Refills: 0 | Status: SHIPPED | OUTPATIENT
Start: 2023-02-06 | End: 2023-04-27

## 2023-02-06 NOTE — TELEPHONE ENCOUNTER
"Pt's son requesting prescription for a Atorvastatin. He read the provider notes from lab results in pt's mychart.     \"Calculated your 10-year atherosclerotic cardiovascular disease risk score as below: is 42.5% which is very elevated , indicates that you need to be on cholesterol medication Called statins, I would recommend high-dose atorvastatin of 80 mg 1 tablet once daily at bedtime.\"    Requesting to send prescription to [facility] [address] in [facility].     "

## 2023-02-07 ENCOUNTER — TELEPHONE (OUTPATIENT)
Dept: FAMILY MEDICINE | Facility: CLINIC | Age: 73
End: 2023-02-07
Payer: MEDICARE

## 2023-02-07 NOTE — TELEPHONE ENCOUNTER
Writer called patient and reviewed above note per Dr. Saavedra, patient expressed verbal understanding and is agreeable. Will follow up with pharmacy and call back to schedule lab appointment, declines offer to schedule now.    No further questions/concerns at this time. Signing encounter.    Caryl Moreno RN  [facility]

## 2023-02-07 NOTE — TELEPHONE ENCOUNTER
See first attempt in lab result note above. Per result note, patient has viewed results:        Patient aware of PCP's instructions below, also see telephone encounter from 2/6/23.    Signing this encounter.    Caryl Moreno RN  [facility]

## 2023-02-07 NOTE — TELEPHONE ENCOUNTER
----- Message from Janey Saavedra MD sent at 2/3/2023  9:59 PM CST -----  Yury Page, reviewed your labs,    Urine microalbumin is negative means there is no protein spilling in the urine which is reassuring,    Cholesterol shows stable numbers from 1 months ago ,LDL still elevated at 143, LDL the bad cholesterol ;goal is less than 100,  HDL the good cholesterol is at goal at 52 which is reassuring, HDL is atheroprotective to the heart, triglyceride is low  Please continue with lifestyle changes, healthy diet low sat fat diet, exercise activities and weight loss strongly recommended    Calculated your 10-year atherosclerotic cardiovascular disease risk score as below: is 42.5% which is very elevated , indicates that you need to be on cholesterol medication Called statins, I would recommend high-dose atorvastatin of 80 mg 1 tablet once daily at bedtime.    , Statins sometimes cause muscle aches ,if such occur please let us know, I will send prescription to pharmacy on record.    Test for diabetes HbA1c slightly improved to 6.8 from 7.2 but still in the diabetic range, I would recommend that you follow-up with the diabetic educator as discussed in the clinic, that will help manage diet calories and even medication for diabetes.  Referral has been placed and you will receive a call from them.    I do recommend that you start on medication called metformin that helps lower HbA1c further you can take 1 tablet 500 mg twice a day with meals always,  If you agree we can send the prescription to pharmacy on records.    Please let us know    Any further questions please let me know  Dr Saavedra    The 10-year ASCVD risk score (Purnima MCELROY, et al., 2019) is: 42.5%    Values used to calculate the score:      Age: 73 years      Sex: Male      Is Non- : No      Diabetic: Yes      Tobacco smoker: No      Systolic Blood Pressure: 132 mmHg      Is BP treated: Yes      HDL Cholesterol: 52 mg/dL      Total  Cholesterol: 208 mg/dL

## 2023-02-09 ENCOUNTER — OFFICE VISIT (OUTPATIENT)
Dept: OPTOMETRY | Facility: CLINIC | Age: 73
End: 2023-02-09
Payer: MEDICARE

## 2023-02-09 DIAGNOSIS — E11.9 TYPE 2 DIABETES MELLITUS WITHOUT COMPLICATION, WITHOUT LONG-TERM CURRENT USE OF INSULIN (H): Primary | ICD-10-CM

## 2023-02-09 DIAGNOSIS — Z96.1 PSEUDOPHAKIA: ICD-10-CM

## 2023-02-09 DIAGNOSIS — H52.223 REGULAR ASTIGMATISM OF BOTH EYES: ICD-10-CM

## 2023-02-09 PROCEDURE — 92015 DETERMINE REFRACTIVE STATE: CPT | Mod: GY | Performed by: OPTOMETRIST

## 2023-02-09 PROCEDURE — 92004 COMPRE OPH EXAM NEW PT 1/>: CPT | Performed by: OPTOMETRIST

## 2023-02-09 ASSESSMENT — REFRACTION_MANIFEST
OS_CYLINDER: +1.00
OD_CYLINDER: SPHERE
OD_ADD: +2.75
OD_AXIS: 114
OD_SPHERE: -0.75
OS_AXIS: 105
OS_CYLINDER: +0.50
OD_CYLINDER: +0.50
OS_SPHERE: -0.75
OS_ADD: +2.75
OD_SPHERE: PLANO
OS_SPHERE: -0.25
OS_AXIS: 117

## 2023-02-09 ASSESSMENT — VISUAL ACUITY
OS_SC: 20/200
METHOD: SNELLEN - LINEAR
OD_SC: 20/30
CORRECTION_TYPE: GLASSES
OS_SC: 20/40
OD_CC: 20/20
OD_CC: 20/20
OD_SC: 20/200
OS_CC: 20/20
OS_CC: 20/20

## 2023-02-09 ASSESSMENT — SLIT LAMP EXAM - LIDS
COMMENTS: NORMAL
COMMENTS: NORMAL

## 2023-02-09 ASSESSMENT — REFRACTION_WEARINGRX
OD_CYLINDER: +0.25
OS_CYLINDER: +0.50
OS_SPHERE: -0.25
OD_ADD: +2.75
OD_AXIS: 111
OS_ADD: +2.75
OS_AXIS: 106
SPECS_TYPE: LINED BIFOCAL
OD_SPHERE: -0.25

## 2023-02-09 ASSESSMENT — CONF VISUAL FIELD
OS_INFERIOR_NASAL_RESTRICTION: 0
OD_SUPERIOR_TEMPORAL_RESTRICTION: 0
OD_INFERIOR_NASAL_RESTRICTION: 0
OS_SUPERIOR_NASAL_RESTRICTION: 0
OD_NORMAL: 1
OS_INFERIOR_TEMPORAL_RESTRICTION: 0
OD_INFERIOR_TEMPORAL_RESTRICTION: 0
OD_SUPERIOR_NASAL_RESTRICTION: 0
OS_SUPERIOR_TEMPORAL_RESTRICTION: 0
METHOD: COUNTING FINGERS
OS_NORMAL: 1

## 2023-02-09 ASSESSMENT — TONOMETRY
OD_IOP_MMHG: 14
IOP_METHOD: APPLANATION
OS_IOP_MMHG: 14

## 2023-02-09 ASSESSMENT — KERATOMETRY
OS_AXISANGLE_DEGREES: 095
OS_K1POWER_DIOPTERS: 43.25
OD_AXISANGLE_DEGREES: 096
OS_AXISANGLE2_DEGREES: 005
OD_AXISANGLE2_DEGREES: 006
OS_K2POWER_DIOPTERS: 44.50
OD_K1POWER_DIOPTERS: 43.00
OD_K2POWER_DIOPTERS: 43.75

## 2023-02-09 ASSESSMENT — EXTERNAL EXAM - RIGHT EYE: OD_EXAM: NORMAL

## 2023-02-09 ASSESSMENT — CUP TO DISC RATIO
OS_RATIO: 0.35
OD_RATIO: 0.35

## 2023-02-09 ASSESSMENT — EXTERNAL EXAM - LEFT EYE: OS_EXAM: NORMAL

## 2023-02-09 NOTE — PROGRESS NOTES
Chief Complaint   Patient presents with     Diabetic Eye Exam     Accompanied by self  Chief Complaint(s) and History of Present Illness(es)     Diabetic Eye Exam            Diabetes Type: Type 2 and taking oral medications    Blood Sugars: is controlled               Lab Results   Component Value Date    A1C 6.8 02/03/2023    A1C 7.2 12/14/2022          Hemoglobin A1C   Date Value Ref Range Status   02/03/2023 6.8 (H) 0.0 - 5.6 % Final     Comment:     Normal <5.7%   Prediabetes 5.7-6.4%    Diabetes 6.5% or higher     Note: Adopted from ADA consensus guidelines.       Last Eye Exam: 3-6 months ago, unsure which clinic   Dilated Previously: Yes, side effects of dilation explained today    What are you currently using to see?  Glasses - will wear them when reading    Distance Vision Acuity: Satisfied with vision    Near Vision Acuity: Satisfied with vision while reading  with glasses    Eye Comfort: good  Do you use eye drops? : No  Occupation or Hobbies: reading    Jewels Espitia, [place]      Medical, surgical and family histories reviewed and updated 2/9/2023.       OBJECTIVE: See Ophthalmology exam    ASSESSMENT:    ICD-10-CM    1. Type 2 diabetes mellitus without complication, without long-term current use of insulin (H)  E11.9 Adult Eye  Referral      2. Pseudophakia  Z96.1       3. Regular astigmatism of both eyes  H52.223           PLAN:    Camilo Vela aware  eye exam results will be sent to No Ref-Primary, Physician.  Patient Instructions   Patient Education  Diabetes weakens the blood vessels all over the body, including the eyes. Damage to the blood vessels in the eyes can cause swelling or bleeding into part of the eye (called the retina). This is called diabetic retinopathy (GILBERTO-tin-AH-puh-thee). If not treated, this disease can cause vision loss or blindness.   Symptoms may include blurred or distorted vision, but many people have no symptoms. It's important to see your eye doctor regularly to  check for problems.   Early treatment and good control can help protect your vision. Here are the things you can do to help prevent vision loss:      1. Keep your blood sugar levels under tight control.      2. Bring high blood pressure under control.      3. No smoking.      4. Have yearly dilated eye exams.     Astigmatism results from curvature differential in the cornea and crystalline lens which can cause a distorted image, as light rays are prevented from meeting at a common focus.    Eyeglass prescription given.    The affects of the dilating drops last for 4- 6 hours.  You will be more sensitive to light and vision will be blurry up close.  Do not drive if you do not feel comfortable.  Mydriatic sunglasses were given if needed.    Recommend annual eye exams.    Tamara Torres O.D.  [address] [address] [phone]

## 2023-02-09 NOTE — LETTER
2/9/2023         RE: Camilo Vela  [address] [place] [address]        Dear Colleague,    Thank you for referring your patient, Camilo Vela, to the [facility]. Please see a copy of my visit note below.    Chief Complaint   Patient presents with     Diabetic Eye Exam     Accompanied by self  Chief Complaint(s) and History of Present Illness(es)     Diabetic Eye Exam            Diabetes Type: Type 2 and taking oral medications    Blood Sugars: is controlled               Lab Results   Component Value Date    A1C 6.8 02/03/2023    A1C 7.2 12/14/2022          Hemoglobin A1C   Date Value Ref Range Status   02/03/2023 6.8 (H) 0.0 - 5.6 % Final     Comment:     Normal <5.7%   Prediabetes 5.7-6.4%    Diabetes 6.5% or higher     Note: Adopted from ADA consensus guidelines.       Last Eye Exam: 3-6 months ago, unsure which clinic   Dilated Previously: Yes, side effects of dilation explained today    What are you currently using to see?  Glasses - will wear them when reading    Distance Vision Acuity: Satisfied with vision    Near Vision Acuity: Satisfied with vision while reading  with glasses    Eye Comfort: good  Do you use eye drops? : No  Occupation or Hobbies: reading    Jewels Espitia, [place]      Medical, surgical and family histories reviewed and updated 2/9/2023.       OBJECTIVE: See Ophthalmology exam    ASSESSMENT:    ICD-10-CM    1. Type 2 diabetes mellitus without complication, without long-term current use of insulin (H)  E11.9 Adult Eye  Referral      2. Pseudophakia  Z96.1       3. Regular astigmatism of both eyes  H52.223           PLAN:    Camilo Vela aware  eye exam results will be sent to No Ref-Primary, Physician.  Patient Instructions   Patient Education  Diabetes weakens the blood vessels all over the body, including the eyes. Damage to the blood vessels in the eyes can cause swelling or bleeding into part of the eye (called the retina).  This is called diabetic retinopathy (GILBERTO-tin-AH-puh-thee). If not treated, this disease can cause vision loss or blindness.   Symptoms may include blurred or distorted vision, but many people have no symptoms. It's important to see your eye doctor regularly to check for problems.   Early treatment and good control can help protect your vision. Here are the things you can do to help prevent vision loss:      1. Keep your blood sugar levels under tight control.      2. Bring high blood pressure under control.      3. No smoking.      4. Have yearly dilated eye exams.     Astigmatism results from curvature differential in the cornea and crystalline lens which can cause a distorted image, as light rays are prevented from meeting at a common focus.    Eyeglass prescription given.    The affects of the dilating drops last for 4- 6 hours.  You will be more sensitive to light and vision will be blurry up close.  Do not drive if you do not feel comfortable.  Mydriatic sunglasses were given if needed.    Recommend annual eye exams.    Tamara Torres O.D.  [address] [id number] [phone]               Again, thank you for allowing me to participate in the care of your patient.        Sincerely,        Tamara Torres, DAVID

## 2023-02-09 NOTE — PATIENT INSTRUCTIONS
Patient Education   Diabetes weakens the blood vessels all over the body, including the eyes. Damage to the blood vessels in the eyes can cause swelling or bleeding into part of the eye (called the retina). This is called diabetic retinopathy (GILBERTO-tin-AH-puh-thee). If not treated, this disease can cause vision loss or blindness.   Symptoms may include blurred or distorted vision, but many people have no symptoms. It's important to see your eye doctor regularly to check for problems.   Early treatment and good control can help protect your vision. Here are the things you can do to help prevent vision loss:      1. Keep your blood sugar levels under tight control.      2. Bring high blood pressure under control.      3. No smoking.      4. Have yearly dilated eye exams.     Astigmatism results from curvature differential in the cornea and crystalline lens which can cause a distorted image, as light rays are prevented from meeting at a common focus.    Eyeglass prescription given.    The affects of the dilating drops last for 4- 6 hours.  You will be more sensitive to light and vision will be blurry up close.  Do not drive if you do not feel comfortable.  Mydriatic sunglasses were given if needed.    Recommend annual eye exams.    Tamara Torres O.D.  [facility]   [address] [place] [address] [phone]

## 2023-03-14 ENCOUNTER — ALLIED HEALTH/NURSE VISIT (OUTPATIENT)
Dept: EDUCATION SERVICES | Facility: CLINIC | Age: 73
End: 2023-03-14
Attending: INTERNAL MEDICINE
Payer: MEDICARE

## 2023-03-14 DIAGNOSIS — E11.9 TYPE 2 DIABETES MELLITUS WITHOUT COMPLICATION, WITHOUT LONG-TERM CURRENT USE OF INSULIN (H): ICD-10-CM

## 2023-03-14 PROCEDURE — 99207 PR DROP WITH A PROCEDURE: CPT

## 2023-03-14 PROCEDURE — G0108 DIAB MANAGE TRN  PER INDIV: HCPCS

## 2023-03-14 RX ORDER — LANCETS
EACH MISCELLANEOUS
Qty: 100 EACH | Refills: 4 | Status: SHIPPED | OUTPATIENT
Start: 2023-03-14

## 2023-03-14 RX ORDER — BLOOD PRESSURE TEST KIT
1 KIT MISCELLANEOUS DAILY
Qty: 1 EACH | Refills: 3 | Status: SHIPPED | OUTPATIENT
Start: 2023-03-14

## 2023-03-14 RX ORDER — BLOOD SUGAR DIAGNOSTIC
STRIP MISCELLANEOUS
Qty: 100 STRIP | Refills: 11 | Status: SHIPPED | OUTPATIENT
Start: 2023-03-14

## 2023-03-14 NOTE — LETTER
3/14/2023         RE: Camilo Vela  [address]  [place] [address]        Dear Colleague,    Thank you for referring your patient, Camilo Vela, to the [facility]. Please see a copy of my visit note below.    Diabetes Self-Management Education & Support    Presents for: Initial Assessment for new diagnosis    Type of Service: In Person Visit    Assessment Type:   ASSESSMENT:  Patient is motivated to make lifestyle changes to improve diabetes. He has already started making changes, he stopped eating injera and breads.  He would like to discuss food and testing blood sugar.     Education provided on carbohydrate sources in traditional [place] foods, portion control and the benefit of spreading intake throughout the day. Discussed the plate planner method, and encouraged 3 meals a day and small snacks between meals if hungry. Discussed Ramadan and keeping meals balanced.     Provided education on SBG monitoring, patient required assistance and repetition on how to test his glucose. Discussed the benefit of alternating checking times to help identify patterns, how often to check BG and reviewed target BG levels.      Patient's most recent   Lab Results   Component Value Date    A1C 6.8 02/03/2023     is meeting goal of <7.0    PLAN  Meal Plan Recommendation: eat 3 meals a day, use plate planning method.  -during Ramadan- do your best to keep your meals balanced.   - avoid adding sugar  or honey to your tea and other foods.    - Stevia is Ok.   Check blood sugars before meals, 2 hours after the start of meals (breakfast, lunch, and dinner)    Refer to the handout during Ramadan    Follow up:  Follow-up diabetes education appointment scheduled on 5/3/23 at 9am.     See Care Plan for co-developed, patient-state behavior change goals.  AVS provided for patient today.    Education Materials Provided:  My Plate Planner and Staying Healthy During Ramadan, my plate planner-  "Nadia      SUBJECTIVE/OBJECTIVE:  Presents for: Initial Assessment for new diagnosis  Accompanied by: Self, Son (alize)  Diabetes education in the past 24mo: No  Focus of Visit: Healthy Eating, Monitoring  Diabetes type: Type 2  Disease course: Improving  How confident are you filling out medical forms by yourself:: Quite a bit  Diabetes management related comments/concerns: no  Transportation concerns: No  Difficulty affording diabetes medication?: No  Difficulty affording diabetes testing supplies?: No  Other concerns:: English as a second language  Cultural Influences/Ethnic Background:  Not  or     Diabetes Symptoms & Complications:  Fatigue: No  Neuropathy: No  Polydipsia: No  Polyphagia: No  Polyuria: No  Visual change: No  Slow healing wounds: No  Complications assessed today?: Yes  Autonomic neuropathy: No  CVA: No  Heart disease: No  Nephropathy: No  Peripheral neuropathy: No  Peripheral Vascular Disease: No  Retinopathy: No  Sexual dysfunction: No    Patient Problem List and Family Medical History reviewed for relevant medical history, current medical status, and diabetes risk factors.    Vitals:  There were no vitals taken for this visit.  Estimated body mass index is 28.57 kg/m  as calculated from the following:    Height as of 2/3/23: 1.778 m (5' 10\").    Weight as of 2/3/23: 90.3 kg (199 lb 1.6 oz).   Last 3 BP:   BP Readings from Last 3 Encounters:   02/03/23 132/75   12/14/22 138/88   08/16/21 129/85       History   Smoking Status     Never   Smokeless Tobacco     Never       Labs:  Lab Results   Component Value Date    A1C 6.8 02/03/2023     Lab Results   Component Value Date     12/14/2022     08/16/2021     Lab Results   Component Value Date     02/03/2023     Direct Measure HDL   Date Value Ref Range Status   02/03/2023 52 >=40 mg/dL Final   ]  GFR Estimate   Date Value Ref Range Status   12/14/2022 88 >60 mL/min/1.73m2 Final     Comment:     " Effective December 21, 2021 eGFRcr in adults is calculated using the 2021 CKD-EPI creatinine equation which includes age and gender (Eron et al., NEJM, DOI: 10.1056/TDDMps8647860)     No results found for: GFRESTBLACK  Lab Results   Component Value Date    CR 0.93 12/14/2022     No results found for: MICROALBUMIN    Healthy Eating:  Healthy Eating Assessed Today: Yes  Cultural/Advent diet restrictions?: No  Meal planning/habits: Avoiding sweets, Carb counting, Low carb, Heart healthy, Low salt, Keeps food records  How many times a week on average do you eat food made away from home (restaurant/take-out)?: 1  Meals include: Breakfast, Lunch, Dinner  Breakfast: before dx:injera, tea with sugar and milk --since dx: egg,nuts,tea with milk and aj No sugar , papaya and avacado smoothie  Lunch: before dx: meat,rice/spaghetti --  after dx: green vegetables, chicken  Dinner: before dx: pizza, injera bread with PB  --since dx: stachy beans with oil, wheat/sorghum  Beverages: Water, Tea, Milk, Other    Being Active:  Being Active Assessed Today: Yes  Exercise:: Yes (walk)  Days per week of moderate to strenuous exercise (like a brisk walk): 3  On average, minutes per day of exercise at this level: 30  How intense was your typical exercise? : Moderate (like brisk walking)  Exercise Minutes per Week: 90  Barrier to exercise: None    Monitoring:  Monitoring Assessed Today: Yes  Did patient bring glucose meter to appointment? : No (new dx- needs meter)  Blood Glucose Meter: Other    Taking Medications:      Taking Medication Assessed Today: Yes  Current Treatments: Diet    Problem Solving:  Problem Solving Assessed Today: Yes  Is the patient at risk for hypoglycemia?: No    Reducing Risks:  Reducing Risks Assessed Today: No  CAD Risks: No known risk factors  Has dilated eye exam at least once a year?: Yes  Sees dentist every 6 months?: No  Feet checked by healthcare provider in the last year?: Yes    Healthy  Coping:  Healthy Coping Assessed Today: Yes  Emotional response to diabetes: Ready to learn  Informal Support system:: Family  Stage of change: PREPARATION (Decided to change - considering how)  Patient Activation Measure Survey Score:  No flowsheet data found.      Care Plan and Education Provided:  Patient was instructed on Accu-Chek Guide Me meter and was able to provide an accurate return demonstration. Patient's blood glucose reading today was 133 mg/dL.  Care Plan: Diabetes   Updates made by Amber Gonzalez RN since 3/14/2023 12:00 AM      Problem: HbA1C Not In Goal       Goal: Establish Regular Follow-Ups with PCP       Task: Discuss with PCP the recommended timing for patient's next follow up visit(s)    Responsible User: Amber Gonzalez RN      Task: Discuss schedule for PCP visits with patient    Responsible User: Amber Gonzalez RN      Goal: Get HbA1C Level in Goal       Task: Educate patient on diabetes education self-management topics    Responsible User: Amber Gonzalez RN      Task: Educate patient on benefits of regular glucose monitoring    Responsible User: Amber Gonzalez RN      Task: Refer patient to appropriate extended care team member, as needed (Medication Therapy Management, Behavioral Health, Physical Therapy, etc.)    Responsible User: Amber Gonzalez RN      Task: Discuss diabetes treatment plan with patient    Responsible User: Amber Gonzalez RN      Problem: Diabetes Self-Management Education Needed to Optimize Self-Care Behaviors       Goal: Understand diabetes pathophysiology and disease progression       Task: Provide education on diabetes pathophysiology and disease progression specfic to patient's diabetes type    Responsible User: Amber Gonzalez RN      Goal: Healthy Eating - follow a healthy eating pattern for diabetes       Task: Provide education on portion control and consistency in amount, composition and timing of food intake Completed 3/14/2023    Responsible User: Amber Gonzalez RN      Task: Provide education on managing carbohydrate intake (carbohydrate counting, plate planning method, etc.) Completed 3/14/2023   Responsible User: Amber Gonzalez RN      Task: Provide education on weight management    Responsible User: Amber Gonzalez RN      Task: Provide education on heart healthy eating    Responsible User: Amber Gonzalez RN      Task: Provide education on eating out    Responsible User: Amber Gonzalez RN      Task: Develop individualized healthy eating plan with patient    Responsible User: Amber Gonzalez RN      Goal: Being Active - get regular physical activity, working up to at least 150 minutes per week       Task: Provide education on relationship of activity to glucose and precautions to take if at risk for low glucose    Responsible User: Amber Gonzalez RN      Task: Discuss barriers to physical activity with patient    Responsible User: Amber Gonzalez RN      Task: Develop physical activity plan with patient    Responsible User: Amber Gonzalez RN      Task: Explore community resources including walking groups, assistance programs, and home videos    Responsible User: Amber Gonzalez RN      Goal: Monitoring - monitor glucose and ketones as directed       Task: Provide education on blood glucose monitoring (purpose, proper technique, frequency, glucose targets, interpreting results, when to use glucose control solution, sharps disposal) Completed 3/14/2023   Responsible User: Amber Gonzalez RN      Task: Provide education on continuous glucose monitoring (sensor placement, use of jazmin or /reader, understanding glucose trends, alerts and alarms, differences between sensor glucose and blood glucose)    Responsible User: Amber Gonzalez RN      Task: Provide education on ketone monitoring (when to monitor, frequency, etc.)    Responsible User: Amber Gonzalez RN      Goal: Taking Medication - patient is  consistently taking medications as directed       Task: Provide education on action of prescribed medication, including when to take and possible side effects    Responsible User: Amber Gonzalez RN      Task: Provide education on insulin and injectable diabetes medications, including administration, storage, site selection and rotation for injection sites    Responsible User: Amber Gonzalez RN      Task: Discuss barriers to medication adherence with patient and provide management technique ideas as appropriate    Responsible User: Amber Gonzalez RN      Task: Provide education on frequency and refill details of medications    Responsible User: Amber Gonzalez RN      Goal: Problem Solving - know how to prevent and manage short-term diabetes complications       Task: Provide education on high blood glucose - causes, signs/symptoms, prevention and treatment    Responsible User: Amber Gonzalez RN      Task: Provide education on low blood glucose - causes, signs/symptoms, prevention, treatment, carrying a carbohydrate source at all times, and medical identification    Responsible User: Amber Gonzalez RN      Task: Provide education on safe travel with diabetes    Responsible User: Amber Gonzalez RN      Task: Provide education on how to care for diabetes on sick days    Responsible User: Amber Gonzalez RN      Task: Provide education on when to call a health care provider    Responsible User: Amber Gonzalez RN      Goal: Reducing Risks - know how to prevent and treat long-term diabetes complications       Task: Provide education on major complications of diabetes, prevention, early diagnostic measures and treatment of complications    Responsible User: Amber Gonzalez RN      Task: Provide education on recommended care for dental, eye and foot health    Responsible User: Amber Gonzalez RN      Task: Provide education on Hemoglobin A1c - goals and relationship to blood glucose levels  Completed 3/14/2023   Responsible User: Amber Gonzalez RN      Task: Provide education on recommendations for heart health - lipid levels and goals, blood pressure and goals, and aspirin therapy, if indicated    Responsible User: Amber Gonzalez RN      Task: Provide education on tobacco cessation    Responsible User: Amber Gonzalez RN      Goal: Healthy Coping - use available resources to cope with the challenges of managing diabetes       Task: Discuss recognizing feelings about having diabetes    Responsible User: Amber Gonzalez RN      Task: Provide education on the benefits of making appropriate lifestyle changes    Responsible User: Amber Gonzalez RN      Task: Provide education on benefits of utilizing support systems    Responsible User: Amber Gonzalez RN      Task: Discuss methods for coping with stress    Responsible User: Amber Gonzalez RN      Task: Provide education on when to seek professional counseling    Responsible User: Amber Gonzalez RN Vickie Baeyen BSN, RN, PHN, [facility]     Time Spent: 90 minutes  Encounter Type: Individual    Any diabetes medication dose changes were made via the CDE Protocol per the patient's referring provider. A copy of this encounter was shared with the provider.

## 2023-03-14 NOTE — PROGRESS NOTES
Diabetes Self-Management Education & Support    Presents for: Initial Assessment for new diagnosis    Type of Service: In Person Visit    Assessment Type:   ASSESSMENT:  Patient is motivated to make lifestyle changes to improve diabetes. He has already started making changes, he stopped eating injera and breads.  He would like to discuss food and testing blood sugar.     Education provided on carbohydrate sources in traditional [place] foods, portion control and the benefit of spreading intake throughout the day. Discussed the plate planner method, and encouraged 3 meals a day and small snacks between meals if hungry. Discussed Ramadan and keeping meals balanced.     Provided education on SBG monitoring, patient required assistance and repetition on how to test his glucose. Discussed the benefit of alternating checking times to help identify patterns, how often to check BG and reviewed target BG levels.      Patient's most recent   Lab Results   Component Value Date    A1C 6.8 02/03/2023     is meeting goal of <7.0    PLAN  Meal Plan Recommendation: eat 3 meals a day, use plate planning method.  -during Ramadan- do your best to keep your meals balanced.   - avoid adding sugar  or honey to your tea and other foods.    - Stevia is Ok.   Check blood sugars before meals, 2 hours after the start of meals (breakfast, lunch, and dinner)    Refer to the handout during Ramadan    Follow up:  Follow-up diabetes education appointment scheduled on 5/3/23 at 9am.     See Care Plan for co-developed, patient-state behavior change goals.  AVS provided for patient today.    Education Materials Provided:  My Plate Planner and Staying Healthy During Ramadan, my plate planner- [facility] and [place]      SUBJECTIVE/OBJECTIVE:  Presents for: Initial Assessment for new diagnosis  Accompanied by: Self, Son (alize)  Diabetes education in the past 24mo: No  Focus of Visit: Healthy Eating, Monitoring  Diabetes type: Type 2  Disease course:  "Improving  How confident are you filling out medical forms by yourself:: Quite a bit  Diabetes management related comments/concerns: no  Transportation concerns: No  Difficulty affording diabetes medication?: No  Difficulty affording diabetes testing supplies?: No  Other concerns:: English as a second language  Cultural Influences/Ethnic Background:  Not  or     Diabetes Symptoms & Complications:  Fatigue: No  Neuropathy: No  Polydipsia: No  Polyphagia: No  Polyuria: No  Visual change: No  Slow healing wounds: No  Complications assessed today?: Yes  Autonomic neuropathy: No  CVA: No  Heart disease: No  Nephropathy: No  Peripheral neuropathy: No  Peripheral Vascular Disease: No  Retinopathy: No  Sexual dysfunction: No    Patient Problem List and Family Medical History reviewed for relevant medical history, current medical status, and diabetes risk factors.    Vitals:  There were no vitals taken for this visit.  Estimated body mass index is 28.57 kg/m  as calculated from the following:    Height as of 2/3/23: 1.778 m (5' 10\").    Weight as of 2/3/23: 90.3 kg (199 lb 1.6 oz).   Last 3 BP:   BP Readings from Last 3 Encounters:   02/03/23 132/75   12/14/22 138/88   08/16/21 129/85       History   Smoking Status     Never   Smokeless Tobacco     Never       Labs:  Lab Results   Component Value Date    A1C 6.8 02/03/2023     Lab Results   Component Value Date     12/14/2022     08/16/2021     Lab Results   Component Value Date     02/03/2023     Direct Measure HDL   Date Value Ref Range Status   02/03/2023 52 >=40 mg/dL Final   ]  GFR Estimate   Date Value Ref Range Status   12/14/2022 88 >60 mL/min/1.73m2 Final     Comment:     Effective December 21, 2021 eGFRcr in adults is calculated using the 2021 CKD-EPI creatinine equation which includes age and gender (Eron leigh al., NEJM, DOI: 10.1056/NVYZgv4497407)     No results found for: GFRESTBLACK  Lab Results   Component Value Date    CR " 0.93 12/14/2022     No results found for: MICROALBUMIN    Healthy Eating:  Healthy Eating Assessed Today: Yes  Cultural/Denominational diet restrictions?: No  Meal planning/habits: Avoiding sweets, Carb counting, Low carb, Heart healthy, Low salt, Keeps food records  How many times a week on average do you eat food made away from home (restaurant/take-out)?: 1  Meals include: Breakfast, Lunch, Dinner  Breakfast: before dx:injera, tea with sugar and milk --since dx: egg,nuts,tea with milk and aj No sugar , papaya and avacado smoothie  Lunch: before dx: meat,rice/spaghetti --  after dx: green vegetables, chicken  Dinner: before dx: pizza, injera bread with PB  --since dx: stachy beans with oil, wheat/sorghum  Beverages: Water, Tea, Milk, Other    Being Active:  Being Active Assessed Today: Yes  Exercise:: Yes (walk)  Days per week of moderate to strenuous exercise (like a brisk walk): 3  On average, minutes per day of exercise at this level: 30  How intense was your typical exercise? : Moderate (like brisk walking)  Exercise Minutes per Week: 90  Barrier to exercise: None    Monitoring:  Monitoring Assessed Today: Yes  Did patient bring glucose meter to appointment? : No (new dx- needs meter)  Blood Glucose Meter: Other    Taking Medications:      Taking Medication Assessed Today: Yes  Current Treatments: Diet    Problem Solving:  Problem Solving Assessed Today: Yes  Is the patient at risk for hypoglycemia?: No    Reducing Risks:  Reducing Risks Assessed Today: No  CAD Risks: No known risk factors  Has dilated eye exam at least once a year?: Yes  Sees dentist every 6 months?: No  Feet checked by healthcare provider in the last year?: Yes    Healthy Coping:  Healthy Coping Assessed Today: Yes  Emotional response to diabetes: Ready to learn  Informal Support system:: Family  Stage of change: PREPARATION (Decided to change - considering how)  Patient Activation Measure Survey Score:  No flowsheet data found.      Care  Plan and Education Provided:  Patient was instructed on Accu-Chek Guide Me meter and was able to provide an accurate return demonstration. Patient's blood glucose reading today was 133 mg/dL.  Care Plan: Diabetes   Updates made by Amber Gonzlaez RN since 3/14/2023 12:00 AM      Problem: HbA1C Not In Goal       Goal: Establish Regular Follow-Ups with PCP       Task: Discuss with PCP the recommended timing for patient's next follow up visit(s)    Responsible User: Amber Gonzalez RN      Task: Discuss schedule for PCP visits with patient    Responsible User: Amber Gonzalez RN      Goal: Get HbA1C Level in Goal       Task: Educate patient on diabetes education self-management topics    Responsible User: Amber Gonzalez RN      Task: Educate patient on benefits of regular glucose monitoring    Responsible User: Amber Gonzalez RN      Task: Refer patient to appropriate extended care team member, as needed (Medication Therapy Management, Behavioral Health, Physical Therapy, etc.)    Responsible User: Amber Gonzalez RN      Task: Discuss diabetes treatment plan with patient    Responsible User: Amber Gonzalez RN      Problem: Diabetes Self-Management Education Needed to Optimize Self-Care Behaviors       Goal: Understand diabetes pathophysiology and disease progression       Task: Provide education on diabetes pathophysiology and disease progression specfic to patient's diabetes type    Responsible User: Amber Gonzalez RN      Goal: Healthy Eating - follow a healthy eating pattern for diabetes       Task: Provide education on portion control and consistency in amount, composition and timing of food intake Completed 3/14/2023   Responsible User: Amber Gonzalez RN      Task: Provide education on managing carbohydrate intake (carbohydrate counting, plate planning method, etc.) Completed 3/14/2023   Responsible User: Amber Gonzalez RN      Task: Provide education on weight management    Responsible  User: Amber Gonzalez RN      Task: Provide education on heart healthy eating    Responsible User: Amber Gonzalez RN      Task: Provide education on eating out    Responsible User: Amber Gonzalez RN      Task: Develop individualized healthy eating plan with patient    Responsible User: Amber Gonzalez RN      Goal: Being Active - get regular physical activity, working up to at least 150 minutes per week       Task: Provide education on relationship of activity to glucose and precautions to take if at risk for low glucose    Responsible User: Amber Gonzalez RN      Task: Discuss barriers to physical activity with patient    Responsible User: Amber Gonzalez RN      Task: Develop physical activity plan with patient    Responsible User: Amber Gonzalez RN      Task: Explore community resources including walking groups, assistance programs, and home videos    Responsible User: Amber Gonzalez RN      Goal: Monitoring - monitor glucose and ketones as directed       Task: Provide education on blood glucose monitoring (purpose, proper technique, frequency, glucose targets, interpreting results, when to use glucose control solution, sharps disposal) Completed 3/14/2023   Responsible User: Amber Gonzalez RN      Task: Provide education on continuous glucose monitoring (sensor placement, use of jazmin or /reader, understanding glucose trends, alerts and alarms, differences between sensor glucose and blood glucose)    Responsible User: Amber Gonzalez RN      Task: Provide education on ketone monitoring (when to monitor, frequency, etc.)    Responsible User: Amber Gonzalez RN      Goal: Taking Medication - patient is consistently taking medications as directed       Task: Provide education on action of prescribed medication, including when to take and possible side effects    Responsible User: Amber Gonzalez RN      Task: Provide education on insulin and injectable diabetes medications,  including administration, storage, site selection and rotation for injection sites    Responsible User: Amber Gonzalez RN      Task: Discuss barriers to medication adherence with patient and provide management technique ideas as appropriate    Responsible User: Amber Gonzalez RN      Task: Provide education on frequency and refill details of medications    Responsible User: Amber Gonzalez RN      Goal: Problem Solving - know how to prevent and manage short-term diabetes complications       Task: Provide education on high blood glucose - causes, signs/symptoms, prevention and treatment    Responsible User: Amber Gonzalez RN      Task: Provide education on low blood glucose - causes, signs/symptoms, prevention, treatment, carrying a carbohydrate source at all times, and medical identification    Responsible User: Amber Gonzalez RN      Task: Provide education on safe travel with diabetes    Responsible User: Amber Gonzalez RN      Task: Provide education on how to care for diabetes on sick days    Responsible User: Amber Gonzalez RN      Task: Provide education on when to call a health care provider    Responsible User: Amber Gonzalez RN      Goal: Reducing Risks - know how to prevent and treat long-term diabetes complications       Task: Provide education on major complications of diabetes, prevention, early diagnostic measures and treatment of complications    Responsible User: Amber Gonzalez RN      Task: Provide education on recommended care for dental, eye and foot health    Responsible User: Amber Gonzalez RN      Task: Provide education on Hemoglobin A1c - goals and relationship to blood glucose levels Completed 3/14/2023   Responsible User: Amber Gonzalez RN      Task: Provide education on recommendations for heart health - lipid levels and goals, blood pressure and goals, and aspirin therapy, if indicated    Responsible User: Amber Gonzalez RN      Task: Provide education  on tobacco cessation    Responsible User: Amber Gonzalez RN      Goal: Healthy Coping - use available resources to cope with the challenges of managing diabetes       Task: Discuss recognizing feelings about having diabetes    Responsible User: Amber Gonzalez RN      Task: Provide education on the benefits of making appropriate lifestyle changes    Responsible User: Amber Gonzalez RN      Task: Provide education on benefits of utilizing support systems    Responsible User: Amber Gonzalez RN      Task: Discuss methods for coping with stress    Responsible User: Amber Gonzalez RN      Task: Provide education on when to seek professional counseling    Responsible User: Amber Gonzalez RN Vickie Baeyen BSN, RN, PHN, [facility]     Time Spent: 90 minutes  Encounter Type: Individual    Any diabetes medication dose changes were made via the CDE Protocol per the patient's referring provider. A copy of this encounter was shared with the provider.

## 2023-03-14 NOTE — PATIENT INSTRUCTIONS
Care Plan:  Meal Plan Recommendation: eat 3 meals a day, use plate planning method.  -during Ramadan- do your best to keep your meals balanced.   - avoid adding sugar  or honey to your tea and other foods.    - Stevia is Ok.   Check blood sugars before meals, 2 hours after the start of meals (breakfast, lunch, and dinner)    Refer to the handout during Ramadan    Follow up:  Follow-up diabetes education appointment scheduled on 5/3/23 at 9am.      Bring blood glucose meter and logbook with you to all doctor and follow-up appointments.     [facility]:  For Your Diabetes or Nutrition Education Appointments Call:  [phone]   For Diabetes or Nutrition Related Questions:   [phone]  Send Immunovaccine Message   If you need a medication refill please contact your pharmacy. Please allow 3 business days for your refills to be completed.

## 2023-03-21 ENCOUNTER — MEDICAL CORRESPONDENCE (OUTPATIENT)
Dept: HEALTH INFORMATION MANAGEMENT | Facility: CLINIC | Age: 73
End: 2023-03-21

## 2023-04-27 ENCOUNTER — OFFICE VISIT (OUTPATIENT)
Dept: FAMILY MEDICINE | Facility: CLINIC | Age: 73
End: 2023-04-27
Payer: MEDICARE

## 2023-04-27 VITALS
HEART RATE: 71 BPM | BODY MASS INDEX: 27.46 KG/M2 | RESPIRATION RATE: 20 BRPM | WEIGHT: 191.8 LBS | OXYGEN SATURATION: 97 % | TEMPERATURE: 97.7 F | HEIGHT: 70 IN | SYSTOLIC BLOOD PRESSURE: 126 MMHG | DIASTOLIC BLOOD PRESSURE: 74 MMHG

## 2023-04-27 DIAGNOSIS — Z11.59 NEED FOR HEPATITIS C SCREENING TEST: ICD-10-CM

## 2023-04-27 DIAGNOSIS — I10 BENIGN ESSENTIAL HYPERTENSION: ICD-10-CM

## 2023-04-27 DIAGNOSIS — E78.5 HYPERLIPIDEMIA LDL GOAL <70: ICD-10-CM

## 2023-04-27 DIAGNOSIS — K21.00 GASTROESOPHAGEAL REFLUX DISEASE WITH ESOPHAGITIS WITHOUT HEMORRHAGE: ICD-10-CM

## 2023-04-27 DIAGNOSIS — E11.9 TYPE 2 DIABETES MELLITUS WITHOUT COMPLICATION, WITHOUT LONG-TERM CURRENT USE OF INSULIN (H): Primary | ICD-10-CM

## 2023-04-27 DIAGNOSIS — Z71.89 ADVANCED CARE PLANNING/COUNSELING DISCUSSION: ICD-10-CM

## 2023-04-27 LAB — HBA1C MFR BLD: 6.2 % (ref 0–5.6)

## 2023-04-27 PROCEDURE — 82043 UR ALBUMIN QUANTITATIVE: CPT | Performed by: NURSE PRACTITIONER

## 2023-04-27 PROCEDURE — 86803 HEPATITIS C AB TEST: CPT | Performed by: NURSE PRACTITIONER

## 2023-04-27 PROCEDURE — 80069 RENAL FUNCTION PANEL: CPT | Performed by: NURSE PRACTITIONER

## 2023-04-27 PROCEDURE — 82570 ASSAY OF URINE CREATININE: CPT | Performed by: NURSE PRACTITIONER

## 2023-04-27 PROCEDURE — 84450 TRANSFERASE (AST) (SGOT): CPT | Performed by: NURSE PRACTITIONER

## 2023-04-27 PROCEDURE — 84460 ALANINE AMINO (ALT) (SGPT): CPT | Performed by: NURSE PRACTITIONER

## 2023-04-27 PROCEDURE — 80061 LIPID PANEL: CPT | Performed by: NURSE PRACTITIONER

## 2023-04-27 PROCEDURE — 83036 HEMOGLOBIN GLYCOSYLATED A1C: CPT | Performed by: NURSE PRACTITIONER

## 2023-04-27 PROCEDURE — 99214 OFFICE O/P EST MOD 30 MIN: CPT | Performed by: NURSE PRACTITIONER

## 2023-04-27 PROCEDURE — 36415 COLL VENOUS BLD VENIPUNCTURE: CPT | Performed by: NURSE PRACTITIONER

## 2023-04-27 PROCEDURE — 83721 ASSAY OF BLOOD LIPOPROTEIN: CPT | Performed by: NURSE PRACTITIONER

## 2023-04-27 RX ORDER — PANTOPRAZOLE SODIUM 40 MG/1
TABLET, DELAYED RELEASE ORAL
Qty: 90 TABLET | Refills: 1 | Status: SHIPPED | OUTPATIENT
Start: 2023-04-27

## 2023-04-27 RX ORDER — ATORVASTATIN CALCIUM 80 MG/1
80 TABLET, FILM COATED ORAL DAILY
Qty: 90 TABLET | Refills: 1 | Status: SHIPPED | OUTPATIENT
Start: 2023-04-27

## 2023-04-27 RX ORDER — PANTOPRAZOLE SODIUM 40 MG/1
TABLET, DELAYED RELEASE ORAL
COMMUNITY
Start: 2022-11-04 | End: 2023-04-27

## 2023-04-27 ASSESSMENT — PAIN SCALES - GENERAL: PAINLEVEL: NO PAIN (0)

## 2023-04-27 NOTE — PROGRESS NOTES
Assessment & Plan     Type 2 diabetes mellitus without complication, without long-term current use of insulin (H)    - HEMOGLOBIN A1C; Future  - Albumin Random Urine Quantitative with Creat Ratio; Future  - Renal panel (Alb, BUN, Ca, Cl, CO2, Creat, Gluc, Phos, K, Na); Future  - metFORMIN (GLUCOPHAGE) 500 MG tablet; 1 tablet with a meal Orally Twice a day for 90 days  - Renal panel (Alb, BUN, Ca, Cl, CO2, Creat, Gluc, Phos, K, Na)  - Albumin Random Urine Quantitative with Creat Ratio  - HEMOGLOBIN A1C    Hyperlipidemia LDL goal <70    - LDL cholesterol direct; Future  - atorvastatin (LIPITOR) 80 MG tablet; Take 1 tablet (80 mg) by mouth daily  - LDL cholesterol direct  - **ALT FUTURE 2mo  - **AST FUTURE 2mo  - Lipid panel reflex to direct LDL Fasting    Gastroesophageal reflux disease with esophagitis without hemorrhage    - pantoprazole (PROTONIX) 40 MG EC tablet; 1 tablet Orally Once a day    Need for hepatitis C screening test    - Hepatitis C Screen Reflex to HCV RNA Quant and Genotype; Future  - Hepatitis C Screen Reflex to HCV RNA Quant and Genotype    Advanced care planning/counseling discussion      30 minutes spent by me on the date of the encounter doing chart review, history and exam, documentation and further activities per the note     See Patient Instructions: Review after visit summary tips, follow-up in 6 months.  Continue your diet and exercise daily.  Follow-up sooner if needed for healthcare questions or concerns.  We will let you know lab results and a change in treatment plan if needed.  Patient and son in agreement.    FLAKO CHEEMA  [facility] ALAN    Leticia Page is a 73 year old, presenting for the following health issues; here with his son:  RECHECK    He reports he is a new diabetic.  He is taking his metformin without any stomach issues.  He is tolerating his cholesterol medication.  Discussed the importance of taking his medication daily,  diabetics have a higher risk of heart attack and stroke.  Discussed symptoms to watch out for and when to go in for evaluation.  He has been working on diet changes, walking daily, weight loss.  He reports his weight has gone from 220 pounds down to 191 pounds.  He checks his blood sugar every other morning and its been below 100.  He would like information about diabetic diet, improving blood pressure, improving cholesterol and improving diabetes.  Discussed tips given on after visit summary.  We will check labs today.  He had a recent eye exam without any vision changes.  He denies numbness or tingling in his feet.  He works at a .  He reports he likes his job and his mental health is stable.  He is declining colon cancer screening today.  He reports he has never smoked.  Discussed daily foot care.  Reviewing after visit summary tips.  Following up in clinic at least every 6 months or if he has new healthcare questions or concerns.  Patient in agreement.      4/27/2023    10:07 AM   Additional Questions   Roomed by [place]   Accompanied by James - son     History of Present Illness       Diabetes:   He presents for follow up of diabetes.  He is checking home blood glucose a few times a week. He checks blood glucose before and after meals.  Blood glucose is never over 200 and never under 70. When his blood glucose is low, the patient is asymptomatic for confusion, blurred vision, lethargy and reports not feeling dizzy, shaky, or weak.  He has no concerns regarding his diabetes at this time.  He is not experiencing numbness or burning in feet, excessive thirst, blurry vision, weight changes or redness, sores or blisters on feet.         He eats 2-3 servings of fruits and vegetables daily.He consumes 0 sweetened beverage(s) daily.He exercises with enough effort to increase his heart rate 30 to 60 minutes per day.  He exercises with enough effort to increase his heart rate 3 or less days per week.   He is  "taking medications regularly.         Review of Systems   Constitutional, HEENT, cardiovascular, pulmonary, GI, , musculoskeletal, neuro, skin, endocrine and psych systems are negative, except as otherwise noted.      Objective    /74   Pulse 71   Temp 97.7  F (36.5  C) (Temporal)   Resp 20   Ht 1.778 m (5' 10\")   Wt 87 kg (191 lb 12.8 oz)   SpO2 97%   BMI 27.52 kg/m    Body mass index is 27.52 kg/m .  Physical Exam   GENERAL: healthy, alert and no distress  EYES: Eyes grossly normal to inspection, PERRL and conjunctivae and sclerae normal  RESP: lungs clear to auscultation - no rales, rhonchi or wheezes  CV: regular rate and rhythm, normal S1 S2, no S3 or S4, no murmur, click or rub, no peripheral edema and peripheral pulses strong  MS: no gross musculoskeletal defects noted, no edema, no CVA tenderness  NEURO: mentation intact and speech normal  PSYCH: mentation appears normal, affect normal/bright  Diabetic foot exam: normal DP and PT pulses, no trophic changes or ulcerative lesions and normal sensory exam      See orders              "

## 2023-04-27 NOTE — RESULT ENCOUNTER NOTE
Yury Page,    Thank you for your recent office visit.    Here are your recent results.  Your A1C has improved a great deal in the last 4 months.  Continue your current medication, daily walking and diet changes.  Review the tips on the after visit summary and incorporate them into your daily routine.  We will recheck labs in 6 months.  Follow-up anytime sooner for healthcare questions or concerns if needed.    Feel free to contact me via [employer] or call the clinic at [phone].    Sincerely,    OMID Tavarez, FNP-BC

## 2023-04-28 LAB
ALBUMIN SERPL-MCNC: 3.8 G/DL (ref 3.4–5)
ALT SERPL W P-5'-P-CCNC: 50 U/L (ref 0–70)
ANION GAP SERPL CALCULATED.3IONS-SCNC: 4 MMOL/L (ref 3–14)
AST SERPL W P-5'-P-CCNC: 29 U/L (ref 0–45)
BUN SERPL-MCNC: 16 MG/DL (ref 7–30)
CALCIUM SERPL-MCNC: 9.3 MG/DL (ref 8.5–10.1)
CHLORIDE BLD-SCNC: 107 MMOL/L (ref 94–109)
CHOLEST SERPL-MCNC: 115 MG/DL
CO2 SERPL-SCNC: 27 MMOL/L (ref 20–32)
CREAT SERPL-MCNC: 0.74 MG/DL (ref 0.66–1.25)
CREAT UR-MCNC: 133 MG/DL
FASTING STATUS PATIENT QL REPORTED: YES
GFR SERPL CREATININE-BSD FRML MDRD: >90 ML/MIN/1.73M2
GLUCOSE BLD-MCNC: 120 MG/DL (ref 70–99)
HCV AB SERPL QL IA: NONREACTIVE
HDLC SERPL-MCNC: 51 MG/DL
LDLC SERPL CALC-MCNC: 50 MG/DL
LDLC SERPL CALC-MCNC: 60 MG/DL
MICROALBUMIN UR-MCNC: 5 MG/L
MICROALBUMIN/CREAT UR: 3.76 MG/G CR (ref 0–17)
NONHDLC SERPL-MCNC: 64 MG/DL
PHOSPHATE SERPL-MCNC: 3.1 MG/DL (ref 2.5–4.5)
POTASSIUM BLD-SCNC: 4.5 MMOL/L (ref 3.4–5.3)
SODIUM SERPL-SCNC: 138 MMOL/L (ref 133–144)
TRIGL SERPL-MCNC: 68 MG/DL

## 2023-05-03 NOTE — RESULT ENCOUNTER NOTE
Yury Page,    Thank you for your recent office visit.    Here are your recent results.  Normal kidney function.  Your cholesterol looks good for being a diabetic.  Negative hepatitis C screening.  Your A1c has improved significantly from 2 months ago.  Continue your current medications, current diet changes and regular exercise.    Feel free to contact me via [employer] or call the clinic at [phone].    Sincerely,    OMID Tavarez, FNP-BC

## 2023-08-05 ENCOUNTER — HEALTH MAINTENANCE LETTER (OUTPATIENT)
Age: 73
End: 2023-08-05

## 2023-10-09 ENCOUNTER — OFFICE VISIT (OUTPATIENT)
Dept: FAMILY MEDICINE | Facility: CLINIC | Age: 73
End: 2023-10-09
Payer: MEDICARE

## 2023-10-09 VITALS
RESPIRATION RATE: 16 BRPM | OXYGEN SATURATION: 99 % | DIASTOLIC BLOOD PRESSURE: 79 MMHG | BODY MASS INDEX: 28.59 KG/M2 | TEMPERATURE: 98.2 F | HEIGHT: 70 IN | SYSTOLIC BLOOD PRESSURE: 143 MMHG | WEIGHT: 199.7 LBS | HEART RATE: 71 BPM

## 2023-10-09 DIAGNOSIS — Z23 HIGH PRIORITY FOR 2019-NCOV VACCINE: ICD-10-CM

## 2023-10-09 DIAGNOSIS — I10 ESSENTIAL HYPERTENSION, BENIGN: ICD-10-CM

## 2023-10-09 DIAGNOSIS — E78.5 HYPERLIPIDEMIA LDL GOAL <70: ICD-10-CM

## 2023-10-09 DIAGNOSIS — E11.9 TYPE 2 DIABETES MELLITUS WITHOUT COMPLICATION, WITHOUT LONG-TERM CURRENT USE OF INSULIN (H): Primary | ICD-10-CM

## 2023-10-09 LAB
ANION GAP SERPL CALCULATED.3IONS-SCNC: 10 MMOL/L (ref 7–15)
BUN SERPL-MCNC: 14.5 MG/DL (ref 8–23)
CALCIUM SERPL-MCNC: 9.7 MG/DL (ref 8.8–10.2)
CHLORIDE SERPL-SCNC: 103 MMOL/L (ref 98–107)
CHOLEST SERPL-MCNC: 198 MG/DL
CREAT SERPL-MCNC: 0.78 MG/DL (ref 0.67–1.17)
DEPRECATED HCO3 PLAS-SCNC: 26 MMOL/L (ref 22–29)
EGFRCR SERPLBLD CKD-EPI 2021: >90 ML/MIN/1.73M2
GLUCOSE SERPL-MCNC: 125 MG/DL (ref 70–99)
HBA1C MFR BLD: 6.6 % (ref 0–5.6)
HDLC SERPL-MCNC: 56 MG/DL
LDLC SERPL CALC-MCNC: 122 MG/DL
NONHDLC SERPL-MCNC: 142 MG/DL
POTASSIUM SERPL-SCNC: 4.5 MMOL/L (ref 3.4–5.3)
SODIUM SERPL-SCNC: 139 MMOL/L (ref 135–145)
TRIGL SERPL-MCNC: 100 MG/DL

## 2023-10-09 PROCEDURE — 80061 LIPID PANEL: CPT | Performed by: INTERNAL MEDICINE

## 2023-10-09 PROCEDURE — 91320 SARSCV2 VAC 30MCG TRS-SUC IM: CPT | Performed by: INTERNAL MEDICINE

## 2023-10-09 PROCEDURE — 36415 COLL VENOUS BLD VENIPUNCTURE: CPT | Performed by: INTERNAL MEDICINE

## 2023-10-09 PROCEDURE — 83036 HEMOGLOBIN GLYCOSYLATED A1C: CPT | Performed by: INTERNAL MEDICINE

## 2023-10-09 PROCEDURE — 90480 ADMN SARSCOV2 VAC 1/ONLY CMP: CPT | Performed by: INTERNAL MEDICINE

## 2023-10-09 PROCEDURE — 80048 BASIC METABOLIC PNL TOTAL CA: CPT | Performed by: INTERNAL MEDICINE

## 2023-10-09 PROCEDURE — 99214 OFFICE O/P EST MOD 30 MIN: CPT | Performed by: INTERNAL MEDICINE

## 2023-10-09 RX ORDER — LOSARTAN POTASSIUM AND HYDROCHLOROTHIAZIDE 12.5; 5 MG/1; MG/1
1 TABLET ORAL DAILY
Qty: 90 TABLET | Refills: 3 | Status: SHIPPED | OUTPATIENT
Start: 2023-10-09

## 2023-10-09 ASSESSMENT — PAIN SCALES - GENERAL: PAINLEVEL: NO PAIN (0)

## 2023-10-09 NOTE — PROGRESS NOTES
"  Assessment & Plan     Type 2 diabetes mellitus without complication, without long-term current use of insulin (H)  His A1c is 6.6 today  He is supposed to be on metformin 500 mg twice a day  Patient tells me he is not taking it  He does not want to take medicines and wants to control his blood sugars through diet and exercise  I encouraged him to rethink about this since his A1c is 6.6 and in the past it was as high as 7.2  For now he will do diet and exercise and come back to me in 3 months  - HEMOGLOBIN A1C; Future  - HEMOGLOBIN A1C    Essential hypertension, benign  Blood pressure is slightly elevated today with systolic of 143 however he tells me he regularly monitors his blood pressure at home and the systolic is in the range of 120 and diastolic 80  For that reason I am not going to change his medications today  - Basic metabolic panel  (Ca, Cl, CO2, Creat, Gluc, K, Na, BUN); Future  - Basic metabolic panel  (Ca, Cl, CO2, Creat, Gluc, K, Na, BUN)  - losartan-hydrochlorothiazide (HYZAAR) 50-12.5 MG tablet; Take 1 tablet by mouth daily    Hyperlipidemia LDL goal <70  Because of his diabetes and blood pressure he has to be on a lipid-lowering therapy  I discussed this with him today  He is of the opinion that he does not have to take the medicine because his numbers are not that bad  However I told him that he has to reconsider this and I strongly advised him to take atorvastatin  - Lipid panel reflex to direct LDL Fasting; Future  - Lipid panel reflex to direct LDL Fasting    High priority for 2019-nCoV vaccine        30 minutes spent by me on the date of the encounter doing chart review, history and exam, documentation and further activities per the note       BMI:   Estimated body mass index is 28.59 kg/m  as calculated from the following:    Height as of this encounter: 1.78 m (5' 10.08\").    Weight as of this encounter: 90.6 kg (199 lb 11.2 oz).   Weight management plan: Discussed healthy diet and exercise " guidelines        Casey Joseph MD  [facility] [place]    Subjective   Camilo is a 73 year old, presenting for the following health issues:  RECHECK (/) and Imm/Inj (COVID-19 VACCINE)      History of Present Illness       Reason for visit:  Physical Check Up    He eats 2-3 servings of fruits and vegetables daily.He consumes 2 sweetened beverage(s) daily.He exercises with enough effort to increase his heart rate 30 to 60 minutes per day.  He exercises with enough effort to increase his heart rate 3 or less days per week.   He is taking medications regularly.         Diabetes Follow-up    How often are you checking your blood sugar? Not at all  What concerns do you have today about your diabetes?Other: Pt having    Do you have any of these symptoms? (Select all that apply)  No numbness or tingling in feet.  No redness, sores or blisters on feet.  No complaints of excessive thirst.  No reports of blurry vision.  No significant changes to weight.          Hyperlipidemia Follow-Up    Are you regularly taking any medication or supplement to lower your cholesterol?   No  Are you having muscle aches or other side effects that you think could be caused by your cholesterol lowering medication?  No    Hypertension Follow-up    Do you check your blood pressure regularly outside of the clinic? Yes   Are you following a low salt diet? No  Are your blood pressures ever more than 140 on the top number (systolic) OR more   than 90 on the bottom number (diastolic), for example 140/90? Yes    BP Readings from Last 2 Encounters:   10/09/23 (!) 143/79   04/27/23 126/74     Hemoglobin A1C (%)   Date Value   10/09/2023 6.6 (H)   04/27/2023 6.2 (H)     LDL Cholesterol Calculated (mg/dL)   Date Value   04/27/2023 50   02/03/2023 143 (H)     LDL Cholesterol Direct (mg/dL)   Date Value   04/27/2023 60           Review of Systems   Constitutional, HEENT, cardiovascular, pulmonary, gi and gu systems are negative, except as  "otherwise noted.      Objective    BP (!) 143/79 (BP Location: Right arm, Patient Position: Sitting, Cuff Size: Adult Large)   Pulse 71   Temp 98.2  F (36.8  C) (Oral)   Resp 16   Ht 1.78 m (5' 10.08\")   Wt 90.6 kg (199 lb 11.2 oz)   SpO2 99%   BMI 28.59 kg/m    Body mass index is 28.59 kg/m .  Physical Exam   GENERAL: healthy, alert and no distress  NECK: no adenopathy, no asymmetry, masses, or scars and thyroid normal to palpation  RESP: lungs clear to auscultation - no rales, rhonchi or wheezes  CV: regular rate and rhythm, normal S1 S2, no S3 or S4, no murmur, click or rub, no peripheral edema and peripheral pulses strong  MS: no gross musculoskeletal defects noted, no edema  Diabetic foot exam: normal DP and PT pulses, no trophic changes or ulcerative lesions, and normal sensory exam                      "

## 2024-03-02 ENCOUNTER — HEALTH MAINTENANCE LETTER (OUTPATIENT)
Age: 74
End: 2024-03-02

## 2024-07-20 ENCOUNTER — HEALTH MAINTENANCE LETTER (OUTPATIENT)
Age: 74
End: 2024-07-20

## 2024-09-03 ENCOUNTER — TELEPHONE (OUTPATIENT)
Dept: FAMILY MEDICINE | Facility: CLINIC | Age: 74
End: 2024-09-03
Payer: MEDICARE

## 2024-09-03 NOTE — TELEPHONE ENCOUNTER
Patient Quality Outreach    Patient is due for the following:   Diabetes -  A1C, Eye Exam, and Microalbumin  Colon Cancer Screening      Topic Date Due    Pneumococcal Vaccine (1 of 2 - PCV) Never done    Zoster (Shingles) Vaccine (1 of 2) Never done    Diptheria Tetanus Pertussis (DTAP/TDAP/TD) Vaccine (2 - Td or Tdap) 08/25/2021    Flu Vaccine (1) 09/01/2024    COVID-19 Vaccine (5 - 2023-24 season) 09/01/2024       Next Steps:   Schedule a Annual Wellness Visit    Type of outreach:    Sent [employer] message.      Questions for provider review:    None           Nayeli Horton

## 2024-10-15 ENCOUNTER — OFFICE VISIT (OUTPATIENT)
Dept: FAMILY MEDICINE | Facility: CLINIC | Age: 74
End: 2024-10-15
Attending: NURSE PRACTITIONER
Payer: MEDICARE

## 2024-10-15 VITALS
SYSTOLIC BLOOD PRESSURE: 154 MMHG | HEART RATE: 75 BPM | TEMPERATURE: 97.7 F | RESPIRATION RATE: 16 BRPM | DIASTOLIC BLOOD PRESSURE: 86 MMHG | WEIGHT: 205 LBS | BODY MASS INDEX: 29.35 KG/M2 | OXYGEN SATURATION: 99 % | HEIGHT: 70 IN

## 2024-10-15 DIAGNOSIS — N40.1 BENIGN PROSTATIC HYPERPLASIA WITH WEAK URINARY STREAM: ICD-10-CM

## 2024-10-15 DIAGNOSIS — Z12.5 PROSTATE CANCER SCREENING: ICD-10-CM

## 2024-10-15 DIAGNOSIS — R39.12 BENIGN PROSTATIC HYPERPLASIA WITH WEAK URINARY STREAM: ICD-10-CM

## 2024-10-15 DIAGNOSIS — Z12.11 COLON CANCER SCREENING: ICD-10-CM

## 2024-10-15 DIAGNOSIS — Z00.00 ENCOUNTER FOR MEDICARE ANNUAL WELLNESS EXAM: Primary | ICD-10-CM

## 2024-10-15 DIAGNOSIS — E11.9 TYPE 2 DIABETES MELLITUS WITHOUT COMPLICATION, WITHOUT LONG-TERM CURRENT USE OF INSULIN (H): ICD-10-CM

## 2024-10-15 DIAGNOSIS — I10 BENIGN ESSENTIAL HYPERTENSION: ICD-10-CM

## 2024-10-15 DIAGNOSIS — R97.20 ELEVATED PROSTATE SPECIFIC ANTIGEN (PSA): ICD-10-CM

## 2024-10-15 DIAGNOSIS — E78.5 HYPERLIPIDEMIA LDL GOAL <70: ICD-10-CM

## 2024-10-15 DIAGNOSIS — Z28.21 VACCINATION NOT CARRIED OUT BECAUSE OF PATIENT REFUSAL: ICD-10-CM

## 2024-10-15 LAB
ALT SERPL W P-5'-P-CCNC: 24 U/L (ref 0–70)
ANION GAP SERPL CALCULATED.3IONS-SCNC: 11 MMOL/L (ref 7–15)
BUN SERPL-MCNC: 13.9 MG/DL (ref 8–23)
CALCIUM SERPL-MCNC: 9.6 MG/DL (ref 8.8–10.4)
CHLORIDE SERPL-SCNC: 102 MMOL/L (ref 98–107)
CHOLEST SERPL-MCNC: 241 MG/DL
CREAT SERPL-MCNC: 0.8 MG/DL (ref 0.67–1.17)
CREAT UR-MCNC: 171 MG/DL
EGFRCR SERPLBLD CKD-EPI 2021: >90 ML/MIN/1.73M2
EST. AVERAGE GLUCOSE BLD GHB EST-MCNC: 157 MG/DL
FASTING STATUS PATIENT QL REPORTED: YES
FASTING STATUS PATIENT QL REPORTED: YES
GLUCOSE SERPL-MCNC: 148 MG/DL (ref 70–99)
HBA1C MFR BLD: 7.1 % (ref 0–5.6)
HCO3 SERPL-SCNC: 25 MMOL/L (ref 22–29)
HDLC SERPL-MCNC: 60 MG/DL
LDLC SERPL CALC-MCNC: 164 MG/DL
MICROALBUMIN UR-MCNC: <12 MG/L
MICROALBUMIN/CREAT UR: NORMAL MG/G{CREAT}
NONHDLC SERPL-MCNC: 181 MG/DL
POTASSIUM SERPL-SCNC: 3.8 MMOL/L (ref 3.4–5.3)
PSA SERPL DL<=0.01 NG/ML-MCNC: 6.71 NG/ML (ref 0–6.5)
SODIUM SERPL-SCNC: 138 MMOL/L (ref 135–145)
TRIGL SERPL-MCNC: 85 MG/DL

## 2024-10-15 PROCEDURE — 36415 COLL VENOUS BLD VENIPUNCTURE: CPT | Performed by: FAMILY MEDICINE

## 2024-10-15 PROCEDURE — G0438 PPPS, INITIAL VISIT: HCPCS | Performed by: FAMILY MEDICINE

## 2024-10-15 PROCEDURE — 82570 ASSAY OF URINE CREATININE: CPT | Performed by: FAMILY MEDICINE

## 2024-10-15 PROCEDURE — 84460 ALANINE AMINO (ALT) (SGPT): CPT | Performed by: FAMILY MEDICINE

## 2024-10-15 PROCEDURE — 82043 UR ALBUMIN QUANTITATIVE: CPT | Performed by: FAMILY MEDICINE

## 2024-10-15 PROCEDURE — G0103 PSA SCREENING: HCPCS | Performed by: FAMILY MEDICINE

## 2024-10-15 PROCEDURE — 80048 BASIC METABOLIC PNL TOTAL CA: CPT | Performed by: FAMILY MEDICINE

## 2024-10-15 PROCEDURE — 80061 LIPID PANEL: CPT | Performed by: FAMILY MEDICINE

## 2024-10-15 PROCEDURE — 99214 OFFICE O/P EST MOD 30 MIN: CPT | Mod: 25 | Performed by: FAMILY MEDICINE

## 2024-10-15 PROCEDURE — 83036 HEMOGLOBIN GLYCOSYLATED A1C: CPT | Performed by: FAMILY MEDICINE

## 2024-10-15 RX ORDER — ATORVASTATIN CALCIUM 80 MG/1
80 TABLET, FILM COATED ORAL DAILY
Qty: 90 TABLET | Refills: 3 | Status: SHIPPED | OUTPATIENT
Start: 2024-10-15

## 2024-10-15 RX ORDER — LOSARTAN POTASSIUM AND HYDROCHLOROTHIAZIDE 12.5; 1 MG/1; MG/1
1 TABLET ORAL DAILY
Qty: 90 TABLET | Refills: 3 | Status: SHIPPED | OUTPATIENT
Start: 2024-10-15

## 2024-10-15 ASSESSMENT — PAIN SCALES - GENERAL: PAINLEVEL: EXTREME PAIN (8)

## 2024-10-15 ASSESSMENT — SOCIAL DETERMINANTS OF HEALTH (SDOH): HOW OFTEN DO YOU GET TOGETHER WITH FRIENDS OR RELATIVES?: MORE THAN THREE TIMES A WEEK

## 2024-10-15 NOTE — PROGRESS NOTES
"Preventive Care Visit  [facility]  Torres Estevez MD, Family Medicine  Oct 15, 2024      Assessment & Plan     (Z00.00) Encounter for Medicare annual wellness exam  (primary encounter diagnosis)  Comment:   Plan: Prostate Specific Antigen Screen, Fecal         colorectal cancer screen FIT        follow up in 1 year    (E11.9) Type 2 diabetes mellitus without complication, without long-term current use of insulin (H)  Comment: historically at goal   Plan: HEMOGLOBIN A1C, OPTOMETRY REFERRAL, FOOT EXAM,         Albumin Random Urine Quantitative with Creat         Ratio        follow up in 3-6 months depending on HgbA1c     (E78.5) Hyperlipidemia LDL goal <70  Comment: Historically not at goal.   Plan: Lipid panel reflex to direct LDL Non-fasting,         ALT, atorvastatin (LIPITOR) 80 MG tablet        Resume atorvastatin.    (I10) Benign essential hypertension  Comment: uncontrolled  Plan: BASIC METABOLIC PANEL,         losartan-hydrochlorothiazide (HYZAAR) 100-12.5         MG tablet        Return in about 6 months (around 4/15/2025) for diabetes, cholesterol, blood pressure check.      (N40.1,  R39.12) Benign prostatic hyperplasia with weak urinary stream  (R97.20) Elevated prostate specific antigen (PSA)  (Z12.5) Prostate cancer screening  Comment: his last PSA was above 4, so I want him to see Urology regardless.  Plan: Prostate Specific Antigen Screen, Adult Urology         Referral            (Z12.11) Colon cancer screening  Comment:   Plan: Fecal colorectal cancer screen FIT            (Z28.21) Vaccination not carried out because of patient refusal  Comment: He declines 5 vaccines that are due and indicated.  Plan:             BMI  Estimated body mass index is 29.35 kg/m  as calculated from the following:    Height as of this encounter: 1.78 m (5' 10.08\").    Weight as of this encounter: 93 kg (205 lb).   Weight management plan: Discussed healthy diet and exercise " guidelines , as summarized in the AVS.    Counseling  Appropriate preventive services were addressed with this patient via screening, questionnaire, or discussion as appropriate for fall prevention, nutrition, physical activity, Tobacco-use cessation, social engagement, weight loss and cognition.  Checklist reviewing preventive services available has been given to the patient.          Leticia Page is a 74 year old, presenting for the following:  Physical and Abdominal Pain        10/15/2024     7:04 AM   Additional Questions   Roomed by Quin   Accompanied by son           Health Care Directive  Patient does not have a Health Care Directive or Living Will: Discussed advance care planning with patient; information given to patient to review.    History of Present Illness       Reason for visit:  Abdominal discomfort     Abdominal Pain    Duration: several months  Description (location/character/radiation): low abdomen, non-acute, dull pain or discomfort when the bladder is full       Associated flank pain: None  Intensity:  mild?  Accompanying signs and symptoms:        Fever/Chills: no        Gas/Bloating: no        Nausea/vomitting: no        Diarrhea: no        Dysuria or Hematuria: no   History (previous similar pain/trauma/previous testing): no  Precipitating or alleviating factors:       Pain worse with eating/BM/urination: no       Pain relieved by BM: no   Therapies tried and outcome: None  LMP:  not applicable  Diabetes Follow-up    How often are you checking your blood sugar? Not at all  What concerns do you have today about your diabetes? None   Do you have any of these symptoms? (Select all that apply)  No numbness or tingling in feet.  No redness, sores or blisters on feet.  No complaints of excessive thirst.  No reports of blurry vision.  No significant changes to weight.  Have you had a diabetic eye exam in the last 12 months? No            Hyperlipidemia Follow-Up    Are you regularly  taking any medication or supplement to lower your cholesterol?   No, stopped taking atorvastatin on his own last year  Are you having muscle aches or other side effects that you think could be caused by your cholesterol lowering medication?  No    Hypertension Follow-up    Stopped taking losartan/HCTZ on his own last year  Do you check your blood pressure regularly outside of the clinic? Yes   Are you following a low salt diet? Yes  Are your blood pressures ever more than 140 on the top number (systolic) OR more   than 90 on the bottom number (diastolic), for example 140/90? Yes, higher in the mornings (140s), lower later (130s)    BP Readings from Last 2 Encounters:   10/15/24 (!) 154/86   10/09/23 (!) 143/79     Hemoglobin A1C (%)   Date Value   10/15/2024 7.1 (H)   10/09/2023 6.6 (H)     LDL Cholesterol Calculated (mg/dL)   Date Value   10/15/2024 164 (H)   10/09/2023 122 (H)     LDL Cholesterol Direct (mg/dL)   Date Value   04/27/2023 60          10/15/2024   General Health   How would you rate your overall physical health? Good   Feel stress (tense, anxious, or unable to sleep) Only a little      (!) STRESS CONCERN      10/15/2024   Nutrition   Diet: Regular (no restrictions)             No data to display                  10/15/2024   Social Factors   Frequency of gathering with friends or relatives More than three times a week   Worry food won't last until get money to buy more No   Food not last or not have enough money for food? No   Do you have housing? (Housing is defined as stable permanent housing and does not include staying ouside in a car, in a tent, in an abandoned building, in an overnight shelter, or couch-surfing.) Yes   Are you worried about losing your housing? No   Lack of transportation? No   Unable to get utilities (heat,electricity)? No            10/15/2024   Fall Risk   Fallen 2 or more times in the past year? No    Trouble with walking or balance? No        Patient-reported           10/15/2024   Activities of Daily Living- Home Safety   Needs help with the following daily activites None of the above   Safety concerns in the home None of the above            10/15/2024   Dental   Dentist two times every year? (!) NO            10/15/2024   Hearing Screening   Hearing concerns? None of the above            10/15/2024   Driving Risk Screening   Patient/family members have concerns about driving No            10/15/2024   General Alertness/Fatigue Screening   Have you been more tired than usual lately? No            10/15/2024   Urinary Incontinence Screening   Bothered by leaking urine in past 6 months No            10/15/2024   TB Screening   Were you born outside of the [place]? Yes            Today's PHQ-2 Score:       10/15/2024     6:49 AM   PHQ-2 ( 1999 [employer])   Q1: Little interest or pleasure in doing things 0    Q2: Feeling down, depressed or hopeless 0    PHQ-2 Score 0   Q1: Little interest or pleasure in doing things Not at all   Q2: Feeling down, depressed or hopeless Not at all   PHQ-2 Score 0       Patient-reported           10/15/2024   Substance Use   Alcohol more than 3/day or more than 7/wk Not Applicable   Do you have a current opioid prescription? No   How severe/bad is pain from 1 to 10? 1/10   Do you use any other substances recreationally? No        Social History     Tobacco Use    Smoking status: Never    Smokeless tobacco: Never   Vaping Use    Vaping status: Never Used   Substance Use Topics    Alcohol use: Never    Drug use: Never           10/15/2024   AAA Screening   Family history of Abdominal Aortic Aneurysm (AAA)? Unsure      ASCVD Risk   The 10-year ASCVD risk score (Purnima MCELROY, et al., 2019) is: 47%    Values used to calculate the score:      Age: 74 years      Sex: Male      Is Non- : Yes      Diabetic: Yes      Tobacco smoker: No      Systolic Blood Pressure: 154 mmHg      Is BP treated: Yes      HDL Cholesterol: 60 mg/dL      Total  "Cholesterol: 241 mg/dL              Reviewed and updated as needed this visit by Provider   Tobacco   Meds  Problems  Med Hx  Surg Hx  Fam Hx  Soc Hx Sexual   Activity            Current providers sharing in care for this patient include:  Patient Care Team:  Clinic - KATHY Henao [facility] as PCP - CARMEN Arevalo MD as MD (Urology)  Tamara Torres OD as MD (Optometry)  Amber Gonzalez, RN as Diabetes Educator (Diabetes Education)  Casey Joseph MD as Assigned PCP    The following health maintenance items are reviewed in Epic and correct as of today:  Health Maintenance   Topic Date Due    Pneumococcal Vaccine: 65+ Years (1 of 2 - PCV) Never done    COLORECTAL CANCER SCREENING  Never done    ZOSTER IMMUNIZATION (1 of 2) Never done    DTAP/TDAP/TD IMMUNIZATION (2 - Td or Tdap) 08/25/2021    EYE EXAM  02/09/2024    INFLUENZA VACCINE (1) 09/01/2024    COVID-19 Vaccine (5 - 2024-25 season) 09/01/2024    A1C  01/15/2025    MEDICARE ANNUAL WELLNESS VISIT  10/15/2025    BMP  10/15/2025    LIPID  10/15/2025    MICROALBUMIN  10/15/2025    DIABETIC FOOT EXAM  10/15/2025    ANNUAL REVIEW OF HM ORDERS  10/15/2025    FALL RISK ASSESSMENT  10/15/2025    RSV VACCINE (1 - 1-dose 75+ series) 01/02/2026    ADVANCE CARE PLANNING  10/15/2029    HEPATITIS C SCREENING  Completed    PHQ-2 (once per calendar year)  Completed    HPV IMMUNIZATION  Aged Out    MENINGITIS IMMUNIZATION  Aged Out    RSV MONOCLONAL ANTIBODY  Aged Out            Objective    Exam  BP (!) 154/86   Pulse 75   Temp 97.7  F (36.5  C) (Temporal)   Resp 16   Ht 1.78 m (5' 10.08\")   Wt 93 kg (205 lb)   SpO2 99%   BMI 29.35 kg/m     Estimated body mass index is 29.35 kg/m  as calculated from the following:    Height as of this encounter: 1.78 m (5' 10.08\").    Weight as of this encounter: 93 kg (205 lb).    Physical Exam  GENERAL: alert and no distress  EYES: Eyes grossly normal to inspection, PERRL and conjunctivae and " sclerae normal  HENT: ear canals and TM's normal, nose and mouth without ulcers or lesions  NECK: no adenopathy, no asymmetry, masses, or scars  RESP: lungs clear to auscultation - no rales, rhonchi or wheezes  CV: regular rate and rhythm, normal S1 S2, no S3 or S4, no murmur, click or rub, no peripheral edema  ABDOMEN: soft, nontender, no hepatosplenomegaly, no masses and bowel sounds normal   (male): normal male genitalia without lesions or urethral discharge, no hernia  MS: no gross musculoskeletal defects noted, no edema  SKIN: no suspicious lesions or rashes  NEURO: Normal strength and tone, mentation intact and speech normal  PSYCH: mentation appears normal, affect normal/bright         No data to display                       Signed Electronically by: Torres Estevez MD

## 2024-10-15 NOTE — PATIENT INSTRUCTIONS
Patient Education   Preventive Care Advice   This is general advice given by our system to help you stay healthy. However, your care team may have specific advice just for you. Please talk to your care team about your preventive care needs.  Nutrition  Eat 5 or more servings of fruits and vegetables each day.  Try wheat bread, brown rice and whole grain pasta (instead of white bread, rice, and pasta).  Get enough calcium and vitamin D. Check the label on foods and aim for 100% of the RDA (recommended daily allowance).  Lifestyle  Exercise at least 150 minutes each week  (30 minutes a day, 5 days a week).  Do muscle strengthening activities 2 days a week. These help control your weight and prevent disease.  No smoking.  Wear sunscreen to prevent skin cancer.  Have a dental exam and cleaning every 6 months.  Yearly exams  See your health care team every year to talk about:  Any changes in your health.  Any medicines your care team has prescribed.  Preventive care, family planning, and ways to prevent chronic diseases.  Shots (vaccines)   HPV shots (up to age 26), if you've never had them before.  Hepatitis B shots (up to age 59), if you've never had them before.  COVID-19 shot: Get this shot when it's due.  Flu shot: Get a flu shot every year.  Tetanus shot: Get a tetanus shot every 10 years.  Pneumococcal, hepatitis A, and RSV shots: Ask your care team if you need these based on your risk.  Shingles shot (for age 50 and up)  General health tests  Diabetes screening:  Starting at age 35, Get screened for diabetes at least every 3 years.  If you are younger than age 35, ask your care team if you should be screened for diabetes.  Cholesterol test: At age 39, start having a cholesterol test every 5 years, or more often if advised.  Bone density scan (DEXA): At age 50, ask your care team if you should have this scan for osteoporosis (brittle bones).  Hepatitis C: Get tested at least once in your life.  STIs (sexually  transmitted infections)  Before age 24: Ask your care team if you should be screened for STIs.  After age 24: Get screened for STIs if you're at risk. You are at risk for STIs (including HIV) if:  You are sexually active with more than one person.  You don't use condoms every time.  You or a partner was diagnosed with a sexually transmitted infection.  If you are at risk for HIV, ask about PrEP medicine to prevent HIV.  Get tested for HIV at least once in your life, whether you are at risk for HIV or not.  Cancer screening tests  Cervical cancer screening: If you have a cervix, begin getting regular cervical cancer screening tests starting at age 21.  Breast cancer scan (mammogram): If you've ever had breasts, begin having regular mammograms starting at age 40. This is a scan to check for breast cancer.  Colon cancer screening: It is important to start screening for colon cancer at age 45.  Have a colonoscopy test every 10 years (or more often if you're at risk) Or, ask your provider about stool tests like a FIT test every year or Cologuard test every 3 years.  To learn more about your testing options, visit:   .  For help making a decision, visit:   https://bit.ly/jt90255.  Prostate cancer screening test: If you have a prostate, ask your care team if a prostate cancer screening test (PSA) at age 55 is right for you.  Lung cancer screening: If you are a current or former smoker ages 50 to 80, ask your care team if ongoing lung cancer screenings are right for you.  For informational purposes only. Not to replace the advice of your health care provider. Copyright   2023 [employer]. All rights reserved. Clinically reviewed by the [facility] Transitions Program. [facility] [id number] - REV 01/24.

## 2024-10-16 ENCOUNTER — TELEPHONE (OUTPATIENT)
Dept: UROLOGY | Facility: CLINIC | Age: 74
End: 2024-10-16
Payer: MEDICARE

## 2024-10-16 NOTE — TELEPHONE ENCOUNTER
Health Call Center    Phone Message    May a detailed message be left on voicemail: yes     Reason for Call: Other: Patient being referred for multiple Dx's by referring provider.      Benign BPH with weak urinary stream  Elevated PSA  Prostate Cancer Screening  LUTS    Writer tried to schedule Pt, but he declined stating that he needed a sooner appointment. Sending encounter message for review and follow-up with Pt for scheduling. Thank you!    Action Taken: Other: FK URO    Travel Screening: Not Applicable     Date of Service:

## 2024-10-16 NOTE — TELEPHONE ENCOUNTER
Called and left vm to call back for appt VRT or in person. Next available.    SAVAGE Dick RN 10/16/2024 12:52 PM

## 2024-10-21 NOTE — TELEPHONE ENCOUNTER
10/21 Patient scheduled on 11/7/24 in [place] with Dr. Yanez.     María Elena daniels Complex   Dermatology, Surgery, Urology  [facility] [place]

## 2024-10-21 NOTE — TELEPHONE ENCOUNTER
Urology triage for referral    Referral placed: Yes  Date referral placed: 10.15.24  Diagnosis listed on referral:   Diagnosis   N40.1, R39.12 (ICD-10-CM) - Benign prostatic hyperplasia with weak urinary stream   R97.20 (ICD-10-CM) - Elevated prostate specific antigen (PSA)   Z12.5 (ICD-10-CM) - Prostate cancer screening   And LUTS  Routine: Next available    Chart review:   Past Medical History:   Diagnosis Date    Hypertension      Pt is willing to go to any location that has sooner availability. He is declining December 13 with Dr. Barksdale as this is the next available.      Sherron THOMAS RN   [facility], Urology   10/21/2024 8:10 AM  '

## 2024-11-07 ENCOUNTER — OFFICE VISIT (OUTPATIENT)
Dept: UROLOGY | Facility: CLINIC | Age: 74
End: 2024-11-07
Payer: MEDICARE

## 2024-11-07 DIAGNOSIS — R39.12 BENIGN PROSTATIC HYPERPLASIA WITH WEAK URINARY STREAM: ICD-10-CM

## 2024-11-07 DIAGNOSIS — Z12.5 PROSTATE CANCER SCREENING: ICD-10-CM

## 2024-11-07 DIAGNOSIS — N40.1 BENIGN PROSTATIC HYPERPLASIA WITH WEAK URINARY STREAM: ICD-10-CM

## 2024-11-07 DIAGNOSIS — R97.20 ELEVATED PROSTATE SPECIFIC ANTIGEN (PSA): Primary | ICD-10-CM

## 2024-11-07 NOTE — NURSING NOTE
Camilo Vela's goals for this visit include:   Chief Complaint   Patient presents with    New Patient     Benign BPH with weak urinary stream  Elevated PSA- 6.71 10/15  Prostate Cancer Screening  LUTS          He requests these members of his care team be copied on today's visit information:     PCP: John - Herrera [facility]    Referring Provider:  Torres Estevez MD  [id number] [address]    post void residual: 15 mL      Do you need any medication refills at today's visit?     María Elena Chaudhary on 11/7/2024 at 2:34 PM

## 2024-11-07 NOTE — PROGRESS NOTES
Urology Consult History and Physical  IVA YO   Name: Camilo Vela    MRN: [id number]   YOB: 1950       We were asked to see Camilo Vela at the request of Dr. Estevez for evaluation and treatment of Elevated PSA .        Chief Complaint:   Elevated PSA     History is obtained from the patient and his son            History of Present Illness:   Camilo Vela is a 74 year old male who is being seen for evaluation of elevated PSA    Has been having issues with increased urgency  Flow and stream is ok  At times she will have some urge incontinence    He also notes intermittent right-sided abdominal and back pain with some radiation down his leg  He notes the pain is worse with stretching to reach while standing or bending over  No clear association with urination    No known family history of prostate cancer           Past Medical History:     Past Medical History:   Diagnosis Date    Hypertension             Past Surgical History:     Past Surgical History:   Procedure Laterality Date    CATARACT IOL, RT/LT Right 11/20/2017    Esau Roach MD    CATARACT IOL, RT/LT Left 11/27/2017    Esau Roach MD            Social History:     Social History     Tobacco Use    Smoking status: Never    Smokeless tobacco: Never   Substance Use Topics    Alcohol use: Never       History   Smoking Status    Never   Smokeless Tobacco    Never            Family History:     Family History   Problem Relation Age of Onset    Glaucoma No family hx of     Macular Degeneration No family hx of               Allergies:   No Known Allergies         Medications:     Current Outpatient Medications   Medication Sig Dispense Refill    aspirin (ASA) 81 MG EC tablet Take 81 mg by mouth daily.      atorvastatin (LIPITOR) 80 MG tablet Take 1 tablet (80 mg) by mouth daily. for cholesterol. 90 tablet 3    losartan-hydrochlorothiazide (HYZAAR) 100-12.5 MG tablet Take 1 tablet by mouth daily. for blood  pressure. 90 tablet 3     No current facility-administered medications for this visit.             Review of Systems:     Reviewed and negative except as noted above          Physical Exam:   No data found.  There is no height or weight on file to calculate BMI.     General: age-appropriate appearing male in NAD  HEENT: Head AT/NC, EOMI, CN Grossly intact  Lungs: no respiratory distress, or pursed lip breathing  Heart: No obvious jugular venous distension present  Back: no bony midline tenderness, no CVAT bilaterally.  Abdomen: soft, non-distended, non-tender.  LE: no edema.   Musculoskeltal: extremities normal, no peripheral edema  Skin: no suspicious lesions or rashes  Neuro: Alert, oriented, speech and mentation normal;  moving all 4 extremities equally.  Psych: affect and mood normal          Data:   All laboratory data reviewed:    UA RESULTS:  Recent Labs   Lab Test 08/16/21  1444   COLOR Yellow   APPEARANCE Clear   URINEGLC Negative   URINEBILI Negative   URINEKETONE Negative   SG 1.020   UBLD Negative   URINEPH 5.5   PROTEIN Negative   UROBILINOGEN 0.2   NITRITE Negative   LEUKEST Negative   RBCU 0-2   WBCU 0-5       PSA Prostate Specific Antigen Screen   Latest Ref Rng 0.00 - 4.00 ug/L 0.00 - 6.50 ng/mL   6/8/2010 2.3     8/16/2021 4.74 (H)     12/14/2022  5.92    10/15/2024  6.71 (H)         Lab Results   Component Value Date    CR 0.80 10/15/2024             Impression and Plan:   Impression:   74-year-old man with elevated PSA, urinary urgency, and vague abdominal and back pain      Plan:   Elevated PSA  - We discussed the use of PSA as a screening test for prostate cancer  - I reviewed his PSA history and trend  - His PSA has been rising over the last several years, now up to 6.71  - We discussed the need for further workup with a prostate MRI.  We discussed the use of prostate MRI and the biopsy naïve patient population and that this has quickly become the clinical standard of care  - I reviewed his  serum creatinine which is stable and normal and will allow for IV contrast dye  - Order for MRI placed  - This is an undiagnosed problem with an uncertain prognosis  - Follow-up after the MRI to discuss the results    Urinary urgency  - He is emptying his bladder well today with a PVR 15 cc  - We discussed the pathophysiology of the bladder and the prostate and the changes associated with the development of BPH and LUTS  - Is unclear at this time if this is related to any BPH, we discussed that the MRI will also show detailed anatomic pictures of the prostate  - Will continue to monitor and treat this going forward    Right-sided abdominal and back pain  - We discussed that this sounds more muscle skeletal in nature and does not seem to be related to his urinary tract  - I will send a message to his PCP, Dr. Estevez, who may order any additional testing     Thank you for the kind consultation.    Time spent: 20 minutes spent on the date of the encounter doing chart review, history and exam, documentation and further activities as noted above.    Amol Yanez MD   Urology  [facility]  [facility] Phone: [phone]  [facility] Phone: [phone]

## 2024-11-07 NOTE — Clinical Note
Hi Dr. Estevez,  I saw Camilo in consultation for his elevated PSA and we will plan for a prostate MRI to assess this further.  He reports ongoing intermittent right-sided abdominal and back pain which does not appear to be related to his urinary tract and sounds more muscle skeletal in nature.  I did not know if you wanted to any additional imaging or workup for this, but I do not think it is related to his urinary tract?  Thanks,  Amol Yanez M.D. Cell: [phone]

## 2024-11-11 ENCOUNTER — TELEPHONE (OUTPATIENT)
Dept: UROLOGY | Facility: CLINIC | Age: 74
End: 2024-11-11
Payer: MEDICARE

## 2024-11-11 NOTE — TELEPHONE ENCOUNTER
Patient is scheduled for an upcoming appointment with  on 12/17/2024.     Per last note patient was to: Follow-up after the MRI to discuss the results     Orders placed: Prostate MRI    Patient is scheduled 12/5/24 to complete Prostate MRI lab/imaging.     [facility]/imaging    Bettina Rose MA on 11/11/2024 at 3:27 PM      Future Appointments 11/11/2024 - 5/10/2025        Date Visit Type Length Department Provider     12/5/2024  8:45 AM MR PROSTATE  WWO 45 min UU MRI UUMR2    Location Instructions:     [facility] [address] [place] [address]  Parking  parking is available on a limited basis during COVID. The  station is located at the main hospital entrance off [address]. Both  and self-parking are the same rates. Current parking options for our patients/visitors are at the Patient & Visitor Parking Ramp, located on [facility] and it is connected to the hospital via a tunnel. Reduced rates for parking in the ramps are in effect during Covid. Ticket validation is no longer needed to receive the reduced rate. Metered street parking is not available.  Entrance and check-in location Enter through the main hospital entrance off [address] or through the tunnel from the patient visitor ramp to the hospital level.&nbsp; You will be entering on Lobby Level which is 2nd floor.&nbsp; A security and information desk is located inside the entrance to provide you with a visitor pass and can direct you to your appointment location.  Check-in with the registration staff in the following imaging center areas:   GOLD WAITING AREA: Cardiac MRI, CT, Interventional radiology, Nuclear Medicine, Ultrasound and X-Ray. The Gold waiting room is located on the lobby entrance level (2nd floor) past the elevators.   PET/MRI WAITING AREA: PET scans, MRI scans that are non-cardiac. Located  on the 1st floor. Take the elevator to the first floor, follow the signs to PET/MRI  This appointment is in a hospital-based location.&nbsp; Before your visit, you may want to check with your insurance company for coverage and referral options, including cost differences between services provided in different clinic settings.&nbsp; For more information visit this link on the [employer] Website:&nbsp; [email or web address]              12/17/2024 10:00 AM RETURN PATIENT 15 min [facility] UROLOGY Amol Yanez MD

## 2024-12-05 ENCOUNTER — HOSPITAL ENCOUNTER (OUTPATIENT)
Dept: MRI IMAGING | Facility: CLINIC | Age: 74
End: 2024-12-05
Attending: UROLOGY
Payer: MEDICARE

## 2024-12-05 DIAGNOSIS — R97.20 ELEVATED PROSTATE SPECIFIC ANTIGEN (PSA): ICD-10-CM

## 2024-12-05 PROCEDURE — 72197 MRI PELVIS W/O & W/DYE: CPT | Mod: MG

## 2024-12-05 PROCEDURE — 255N000002 HC RX 255 OP 636: Performed by: UROLOGY

## 2024-12-05 PROCEDURE — A9585 GADOBUTROL INJECTION: HCPCS | Performed by: UROLOGY

## 2024-12-05 RX ORDER — GADOBUTROL 604.72 MG/ML
10 INJECTION INTRAVENOUS ONCE
Status: COMPLETED | OUTPATIENT
Start: 2024-12-05 | End: 2024-12-05

## 2024-12-05 RX ADMIN — GADOBUTROL 9.5 ML: 604.72 INJECTION INTRAVENOUS at 10:12

## 2024-12-05 NOTE — TELEPHONE ENCOUNTER
Patient arrived for MRI. Patient scheduled for follow up on 12/17/24.     María Elena Chaudhary on 12/5/2024 at 9:53 AM

## 2024-12-17 ENCOUNTER — TELEPHONE (OUTPATIENT)
Dept: UROLOGY | Facility: CLINIC | Age: 74
End: 2024-12-17

## 2024-12-17 ENCOUNTER — OFFICE VISIT (OUTPATIENT)
Dept: UROLOGY | Facility: CLINIC | Age: 74
End: 2024-12-17
Payer: MEDICARE

## 2024-12-17 DIAGNOSIS — R97.20 ELEVATED PROSTATE SPECIFIC ANTIGEN (PSA): Primary | ICD-10-CM

## 2024-12-17 RX ORDER — CIPROFLOXACIN 500 MG/1
500 TABLET, FILM COATED ORAL 2 TIMES DAILY
Qty: 6 TABLET | Refills: 0 | Status: SHIPPED | OUTPATIENT
Start: 2024-12-17

## 2024-12-17 NOTE — NURSING NOTE
Camilo Vela's goals for this visit include:   Chief Complaint   Patient presents with    RECHECK     MRI follow up DOS 12/5       He requests these members of his care team be copied on today's visit information:     PCP: John - KATHY Henao [facility]    Referring Provider:  Referred Self, MD  No address on file    There were no vitals taken for this visit.    Do you need any medication refills at today's visit?     Bettina Rose MA on 12/17/2024 at 9:49 AM

## 2024-12-17 NOTE — PROGRESS NOTES
[facility]   CHIEF COMPLAINT   It was my pleasure to see Camilo Vela who is a 74 year old male for follow-up of Elevated PSA .      HPI   Camilo Vela is a very pleasant 74 year old male     Initially seen 11/7/2024:  Camilo Vela is a 74 year old male who is being seen for evaluation of elevated PSA     Has been having issues with increased urgency  Flow and stream is ok  At times she will have some urge incontinence     He also notes intermittent right-sided abdominal and back pain with some radiation down his leg  He notes the pain is worse with stretching to reach while standing or bending over  No clear association with urination     No known family history of prostate cancer    TODAY 12/17/2024:  Follow-up today to review his prostate MRI  He did well with the MRI with no issues  His son joins him for this visit    He notes that he will be traveling from early January through sometime in March    PHYSICAL EXAM  Patient is a 74 year old  male   Vitals: There were no vitals taken for this visit.  There is no height or weight on file to calculate BMI.  General Appearance Adult:   Alert, no acute distress, oriented  HENT: throat/mouth:normal, good dentition  Lungs: no respiratory distress, or pursed lip breathing  Heart: No obvious jugular venous distension present  Neuro: Alert, oriented, speech and mentation normal  Psych: affect and mood normal  Gait: Normal         PSA Prostate Specific Antigen Screen   Latest Ref Rng 0.00 - 4.00 ug/L 0.00 - 6.50 ng/mL   6/8/2010 2.3      8/16/2021 4.74 (H)      12/14/2022   5.92    10/15/2024   6.71 (H)      Creatinine   Date Value Ref Range Status   10/15/2024 0.80 0.67 - 1.17 mg/dL Final      IMAGING:  All pertinent imaging reviewed:    All imaging studies reviewed by me.  I personally reviewed these imaging films.  A formal report from radiology will follow.    MRI PROSTATE 12/5/2024:  FINDINGS:  Size: 5.2 x 3.5 x 5.1 cm. 48.3 grams  Hemorrhage:  Mild  Peripheral zone: Heterogeneous on T2-weighted images. Suspicious  lesions as detailed below.  Transition zone: Enlarged with BPH changes. Transition zone nodules  which are circumscribed or mostly encapsulated without diffusion  restriction.  PI-RADS 2.  No highly suspicious nodules.      Lesion(s) in rank order of severity (highest score- to lowest score,  then by size)      Lesion 1:  Location: Right mid gland peripheral zone at the 9 o'clock position  relative to the urethra. Series 6 image 60.   Additional prostate regions involved: None  Size: 9 mm  T2 description: Circumscribed homogenous hypointense signal  T2 numerical assessment: 4  DWI description: Focal hypointense signal on ADC and hyperintense on  T2 WI  DWI numerical assessment: 4  DCE assessment: Negative    Prostate margin: No capsular abutment  Lesion overall PI-RADS category: 4        Neurovascular bundles: No neurovascular bundle involvement by  malignancy.  Seminal vesicles: No seminal vesicle involvement by malignancy.  Lymph nodes: No lymph node involvement. Indeterminate 1 cm short axis  right external iliac chain lymph node (65/28).  Bones: No suspicious lesions  Other pelvic organs: No additional findings.                                                     IMPRESSION:  1. Based on the most suspicious abnormality, this exam is  characterized as PIRADS 4 - Clinically significant cancer is likely to  be present. The most suspicious abnormality is located at the right  mid gland peripheral zone and there is no evidence of extraprostatic  extension. If biopsy results are negative, may be indicative that this  represents an extruded BPH nodule.  2. No suspicious adenopathy or evidence of pelvic metastases.    ASSESSMENT and PLAN  74-year-old man with elevated PSA now with a PI-RADS 4 lesion on MRI    Elevated PSA  - We again reviewed his PSA history and trend  - I reviewed his MRI and reviewed these images personally.  I agree with  radiologist interpretation.  We discussed the PI-RADS scoring system and that a PI-RADS 4 lesion will correlate with clinically significant prostate cancer 50 to 80% of the time  - We discussed the need to proceed with an MR fusion UroNav biopsy  - TRUS/Bx - we discussed the risks and benefits of the prostate biopsy including the normal intermittent hematuria, blood per rectum, and blood with ejaculation as well as a 1-2% risk of post biopsy sepsis requiring hospitalization and IV antibiotics   -Prescription for ciprofloxacin sent to the pharmacy  - Given his upcoming 2 to 3 months of travel, we will ideally add him on for a biopsy on 12/20/2024 at 10 AM  - We discussed that if clinically significant prostate cancer is detected, this would then allow for recovery after the biopsy prior to any treatment, without significant delays in his care  - This is an undiagnosed problem with an uncertain prognosis      Time spent: 15 minutes spent on the date of the encounter doing chart review, history and exam, documentation and further activities as noted above.    Amol Yanez MD   Urology  [facility]  [facility] Phone: [phone]  [facility] Phone: [phone]

## 2025-02-15 ENCOUNTER — HEALTH MAINTENANCE LETTER (OUTPATIENT)
Age: 75
End: 2025-02-15

## 2025-05-17 ENCOUNTER — HEALTH MAINTENANCE LETTER (OUTPATIENT)
Age: 75
End: 2025-05-17

## 2025-06-09 ENCOUNTER — TELEPHONE (OUTPATIENT)
Dept: FAMILY MEDICINE | Facility: CLINIC | Age: 75
End: 2025-06-09
Payer: MEDICARE

## 2025-06-09 NOTE — TELEPHONE ENCOUNTER
Patient Quality Outreach    Patient is due for the following:   Diabetes -  A1C    Action(s) Taken:   Schedule a office visit for Diabetes- A1C     Type of outreach:    Sent [employer] message.    Questions for provider review:    None         Jeanette Lozoya MA  Chart routed to None.

## 2025-07-21 DIAGNOSIS — I10 BENIGN ESSENTIAL HYPERTENSION: ICD-10-CM

## 2025-07-21 RX ORDER — LOSARTAN POTASSIUM AND HYDROCHLOROTHIAZIDE 12.5; 1 MG/1; MG/1
1 TABLET ORAL DAILY
Qty: 90 TABLET | Refills: 3 | OUTPATIENT
Start: 2025-07-21

## 2025-08-27 DIAGNOSIS — E78.5 HYPERLIPIDEMIA LDL GOAL <70: ICD-10-CM

## 2025-08-27 RX ORDER — ATORVASTATIN CALCIUM 80 MG/1
80 TABLET, FILM COATED ORAL DAILY
Qty: 90 TABLET | Refills: 0 | Status: SHIPPED | OUTPATIENT
Start: 2025-08-27

## 2025-08-30 ENCOUNTER — HEALTH MAINTENANCE LETTER (OUTPATIENT)
Age: 75
End: 2025-08-30